# Patient Record
Sex: FEMALE | Race: BLACK OR AFRICAN AMERICAN | NOT HISPANIC OR LATINO
[De-identification: names, ages, dates, MRNs, and addresses within clinical notes are randomized per-mention and may not be internally consistent; named-entity substitution may affect disease eponyms.]

---

## 2019-01-04 ENCOUNTER — APPOINTMENT (OUTPATIENT)
Dept: DERMATOLOGY | Facility: CLINIC | Age: 34
End: 2019-01-04
Payer: COMMERCIAL

## 2019-01-04 DIAGNOSIS — L30.9 DERMATITIS, UNSPECIFIED: ICD-10-CM

## 2019-01-04 PROCEDURE — 99203 OFFICE O/P NEW LOW 30 MIN: CPT

## 2019-01-04 RX ORDER — DOXYCYCLINE HYCLATE 100 MG/1
100 TABLET ORAL
Qty: 10 | Refills: 0 | Status: ACTIVE | COMMUNITY
Start: 2019-01-04 | End: 1900-01-01

## 2019-01-04 RX ORDER — CLOBETASOL PROPIONATE 0.5 MG/G
0.05 OINTMENT TOPICAL TWICE DAILY
Qty: 1 | Refills: 1 | Status: ACTIVE | COMMUNITY
Start: 2019-01-04 | End: 1900-01-01

## 2019-01-04 NOTE — HISTORY OF PRESENT ILLNESS
[FreeTextEntry1] : bug bite [de-identified] : 33F here for eval of \par 1. 2 large itchy bumps on thighs since yesterday. Very itchy. started as a pink bump now indurated 5/10 painful. No topicals applied. \par Pt denies travel to woody or forested areas recently. No one at home with similar sx. Denies f/c, arthralgias. \par \par SH: works for [employer]

## 2019-06-11 ENCOUNTER — RESULT REVIEW (OUTPATIENT)
Age: 34
End: 2019-06-11

## 2019-07-10 ENCOUNTER — APPOINTMENT (OUTPATIENT)
Dept: ANTEPARTUM | Facility: CLINIC | Age: 34
End: 2019-07-10

## 2019-07-12 ENCOUNTER — APPOINTMENT (OUTPATIENT)
Dept: ANTEPARTUM | Facility: CLINIC | Age: 34
End: 2019-07-12
Payer: COMMERCIAL

## 2019-07-12 ENCOUNTER — ASOB RESULT (OUTPATIENT)
Age: 34
End: 2019-07-12

## 2019-07-12 PROCEDURE — 76813 OB US NUCHAL MEAS 1 GEST: CPT

## 2019-07-12 PROCEDURE — 76801 OB US < 14 WKS SINGLE FETUS: CPT

## 2019-07-12 PROCEDURE — 36416 COLLJ CAPILLARY BLOOD SPEC: CPT

## 2019-08-14 ENCOUNTER — ASOB RESULT (OUTPATIENT)
Age: 34
End: 2019-08-14

## 2019-08-14 ENCOUNTER — APPOINTMENT (OUTPATIENT)
Dept: ANTEPARTUM | Facility: CLINIC | Age: 34
End: 2019-08-14
Payer: COMMERCIAL

## 2019-08-14 PROCEDURE — 99213 OFFICE O/P EST LOW 20 MIN: CPT | Mod: 25

## 2019-09-11 ENCOUNTER — APPOINTMENT (OUTPATIENT)
Dept: ANTEPARTUM | Facility: CLINIC | Age: 34
End: 2019-09-11
Payer: COMMERCIAL

## 2019-09-11 ENCOUNTER — ASOB RESULT (OUTPATIENT)
Age: 34
End: 2019-09-11

## 2019-09-11 PROCEDURE — 76811 OB US DETAILED SNGL FETUS: CPT

## 2019-09-25 ENCOUNTER — APPOINTMENT (OUTPATIENT)
Dept: ANTEPARTUM | Facility: CLINIC | Age: 34
End: 2019-09-25
Payer: COMMERCIAL

## 2019-09-25 ENCOUNTER — ASOB RESULT (OUTPATIENT)
Age: 34
End: 2019-09-25

## 2019-09-25 PROCEDURE — 76816 OB US FOLLOW-UP PER FETUS: CPT

## 2019-10-29 ENCOUNTER — EMERGENCY (EMERGENCY)
Facility: HOSPITAL | Age: 34
LOS: 1 days | Discharge: HOME | End: 2019-10-29
Attending: EMERGENCY MEDICINE | Admitting: EMERGENCY MEDICINE
Payer: COMMERCIAL

## 2019-10-29 VITALS
RESPIRATION RATE: 18 BRPM | OXYGEN SATURATION: 98 % | SYSTOLIC BLOOD PRESSURE: 112 MMHG | HEART RATE: 87 BPM | DIASTOLIC BLOOD PRESSURE: 68 MMHG

## 2019-10-29 VITALS
TEMPERATURE: 99 F | DIASTOLIC BLOOD PRESSURE: 80 MMHG | HEART RATE: 88 BPM | SYSTOLIC BLOOD PRESSURE: 114 MMHG | RESPIRATION RATE: 15 BRPM | OXYGEN SATURATION: 98 %

## 2019-10-29 LAB
ALBUMIN SERPL ELPH-MCNC: 2.8 G/DL — LOW (ref 3.3–5)
ALP SERPL-CCNC: 53 U/L — SIGNIFICANT CHANGE UP (ref 40–120)
ALT FLD-CCNC: 10 U/L — SIGNIFICANT CHANGE UP (ref 4–33)
ANION GAP SERPL CALC-SCNC: 11 MMO/L — SIGNIFICANT CHANGE UP (ref 7–14)
APPEARANCE UR: SIGNIFICANT CHANGE UP
AST SERPL-CCNC: 17 U/L — SIGNIFICANT CHANGE UP (ref 4–32)
BACTERIA # UR AUTO: HIGH
BASOPHILS # BLD AUTO: 0.03 K/UL — SIGNIFICANT CHANGE UP (ref 0–0.2)
BASOPHILS NFR BLD AUTO: 0.3 % — SIGNIFICANT CHANGE UP (ref 0–2)
BILIRUB SERPL-MCNC: < 0.2 MG/DL — LOW (ref 0.2–1.2)
BILIRUB UR-MCNC: NEGATIVE — SIGNIFICANT CHANGE UP
BLOOD UR QL VISUAL: NEGATIVE — SIGNIFICANT CHANGE UP
BUN SERPL-MCNC: 8 MG/DL — SIGNIFICANT CHANGE UP (ref 7–23)
CALCIUM SERPL-MCNC: 8.8 MG/DL — SIGNIFICANT CHANGE UP (ref 8.4–10.5)
CHLORIDE SERPL-SCNC: 106 MMOL/L — SIGNIFICANT CHANGE UP (ref 98–107)
CO2 SERPL-SCNC: 18 MMOL/L — LOW (ref 22–31)
COLOR SPEC: YELLOW — SIGNIFICANT CHANGE UP
CREAT SERPL-MCNC: 0.67 MG/DL — SIGNIFICANT CHANGE UP (ref 0.5–1.3)
EOSINOPHIL # BLD AUTO: 0.11 K/UL — SIGNIFICANT CHANGE UP (ref 0–0.5)
EOSINOPHIL NFR BLD AUTO: 1.1 % — SIGNIFICANT CHANGE UP (ref 0–6)
GLUCOSE SERPL-MCNC: 107 MG/DL — HIGH (ref 70–99)
GLUCOSE UR-MCNC: NEGATIVE — SIGNIFICANT CHANGE UP
HCT VFR BLD CALC: 28.9 % — LOW (ref 34.5–45)
HGB BLD-MCNC: 9.1 G/DL — LOW (ref 11.5–15.5)
IMM GRANULOCYTES NFR BLD AUTO: 1 % — SIGNIFICANT CHANGE UP (ref 0–1.5)
KETONES UR-MCNC: NEGATIVE — SIGNIFICANT CHANGE UP
LEUKOCYTE ESTERASE UR-ACNC: NEGATIVE — SIGNIFICANT CHANGE UP
LIDOCAIN IGE QN: 33.6 U/L — SIGNIFICANT CHANGE UP (ref 7–60)
LYMPHOCYTES # BLD AUTO: 1.47 K/UL — SIGNIFICANT CHANGE UP (ref 1–3.3)
LYMPHOCYTES # BLD AUTO: 15.1 % — SIGNIFICANT CHANGE UP (ref 13–44)
MCHC RBC-ENTMCNC: 30.7 PG — SIGNIFICANT CHANGE UP (ref 27–34)
MCHC RBC-ENTMCNC: 31.5 % — LOW (ref 32–36)
MCV RBC AUTO: 97.6 FL — SIGNIFICANT CHANGE UP (ref 80–100)
MONOCYTES # BLD AUTO: 0.75 K/UL — SIGNIFICANT CHANGE UP (ref 0–0.9)
MONOCYTES NFR BLD AUTO: 7.7 % — SIGNIFICANT CHANGE UP (ref 2–14)
MUCOUS THREADS # UR AUTO: SIGNIFICANT CHANGE UP
NEUTROPHILS # BLD AUTO: 7.27 K/UL — SIGNIFICANT CHANGE UP (ref 1.8–7.4)
NEUTROPHILS NFR BLD AUTO: 74.8 % — SIGNIFICANT CHANGE UP (ref 43–77)
NITRITE UR-MCNC: NEGATIVE — SIGNIFICANT CHANGE UP
NRBC # FLD: 0 K/UL — SIGNIFICANT CHANGE UP (ref 0–0)
PH UR: 6 — SIGNIFICANT CHANGE UP (ref 5–8)
PLATELET # BLD AUTO: 212 K/UL — SIGNIFICANT CHANGE UP (ref 150–400)
PMV BLD: 9.9 FL — SIGNIFICANT CHANGE UP (ref 7–13)
POTASSIUM SERPL-MCNC: 4 MMOL/L — SIGNIFICANT CHANGE UP (ref 3.5–5.3)
POTASSIUM SERPL-SCNC: 4 MMOL/L — SIGNIFICANT CHANGE UP (ref 3.5–5.3)
PROT SERPL-MCNC: 6.1 G/DL — SIGNIFICANT CHANGE UP (ref 6–8.3)
PROT UR-MCNC: 30 — SIGNIFICANT CHANGE UP
RBC # BLD: 2.96 M/UL — LOW (ref 3.8–5.2)
RBC # FLD: 13.2 % — SIGNIFICANT CHANGE UP (ref 10.3–14.5)
RBC CASTS # UR COMP ASSIST: SIGNIFICANT CHANGE UP (ref 0–?)
SODIUM SERPL-SCNC: 135 MMOL/L — SIGNIFICANT CHANGE UP (ref 135–145)
SP GR SPEC: 1.03 — SIGNIFICANT CHANGE UP (ref 1–1.04)
SQUAMOUS # UR AUTO: SIGNIFICANT CHANGE UP
UROBILINOGEN FLD QL: NORMAL — SIGNIFICANT CHANGE UP
WBC # BLD: 9.73 K/UL — SIGNIFICANT CHANGE UP (ref 3.8–10.5)
WBC # FLD AUTO: 9.73 K/UL — SIGNIFICANT CHANGE UP (ref 3.8–10.5)
WBC UR QL: HIGH (ref 0–?)

## 2019-10-29 PROCEDURE — 99283 EMERGENCY DEPT VISIT LOW MDM: CPT

## 2019-10-29 RX ORDER — DOCUSATE SODIUM 100 MG
1 CAPSULE ORAL
Qty: 20 | Refills: 0
Start: 2019-10-29

## 2019-10-29 RX ORDER — SODIUM CHLORIDE 9 MG/ML
1000 INJECTION INTRAMUSCULAR; INTRAVENOUS; SUBCUTANEOUS ONCE
Refills: 0 | Status: COMPLETED | OUTPATIENT
Start: 2019-10-29 | End: 2019-10-29

## 2019-10-29 RX ORDER — AER TRAVELER 0.5 G/1
1 SOLUTION RECTAL; TOPICAL
Qty: 1 | Refills: 0
Start: 2019-10-29

## 2019-10-29 RX ORDER — NITROFURANTOIN MACROCRYSTAL 50 MG
1 CAPSULE ORAL
Qty: 10 | Refills: 0
Start: 2019-10-29 | End: 2019-11-02

## 2019-10-29 RX ORDER — LIDOCAINE 4 G/100G
10 CREAM TOPICAL ONCE
Refills: 0 | Status: COMPLETED | OUTPATIENT
Start: 2019-10-29 | End: 2019-10-29

## 2019-10-29 RX ADMIN — LIDOCAINE 10 MILLILITER(S): 4 CREAM TOPICAL at 08:52

## 2019-10-29 RX ADMIN — Medication 30 MILLILITER(S): at 08:52

## 2019-10-29 RX ADMIN — SODIUM CHLORIDE 1000 MILLILITER(S): 9 INJECTION INTRAMUSCULAR; INTRAVENOUS; SUBCUTANEOUS at 08:52

## 2019-10-29 NOTE — ED ADULT NURSE NOTE - NSIMPLEMENTINTERV_GEN_ALL_ED
Implemented All Universal Safety Interventions:  [employer] to call system. Call bell, personal items and telephone within reach. Instruct patient to call for assistance. Room bathroom lighting operational. Non-slip footwear when patient is off stretcher. Physically safe environment: no spills, clutter or unnecessary equipment. Stretcher in lowest position, wheels locked, appropriate side rails in place.

## 2019-10-29 NOTE — ED PROVIDER NOTE - PROGRESS NOTE DETAILS
Carolyn Villa M.D. Resident  Fetal heart rate 141. Dr. Daniel: Pt was signed out to me awaiting medication and re-eval. Pt notes having some pain relief from lidocaine to hemorrhoid, non-thrombosed. Mild epigastric tenderness. Awaiting medication and labs. Dr. Daniel: Pt states feeling better. Pain has resolved. Tolerating PO. Advised small meals and sent Tuck's pads to pharmacy. Dr. Daniel: Pt states feeling better. Pain has resolved. Tolerating PO. No abd tenderness. Advised small meals and sent Tuck's pads to pharmacy. Sent Macrobid to pharmacy and discussed that a urine culture was sent.

## 2019-10-29 NOTE — ED PROVIDER NOTE - CLINICAL SUMMARY MEDICAL DECISION MAKING FREE TEXT BOX
Young healthy female with no sig pmhx, 27 weeks pregnant, pw chest pain and hemorrhoids. Low suspicion for ACS and PE, given normal vitals, non pleuritic, reproducible chest pain. More likely GERD and costochondritis from gravid uterus. Young healthy female with no sig pmhx, 27 weeks pregnant, pw chest pain and hemorrhoids. Low suspicion for ACS and PE, given normal vitals, non pleuritic, reproducible chest pain. More likely GERD and costochondritis from gravid uterus. hemorrhoid soft, not concerning for thrombosis.

## 2019-10-29 NOTE — ED PROVIDER NOTE - ATTENDING CONTRIBUTION TO CARE
pt presenting with two complaints.  27 weeks pregnant.  First is a burning sensation for weeks in the inframammillary fold on the Left.  Not exertional worse with laying flat and after eating.  Also with severe rectal pain - has a known hemhorroid.  Worse with walking or defecating.  No bleeding.    Gen: Well appearing in NAD  Head: NC/AT  Neck: trachea midline  Resp:  No distress  Abd: soft gravid - external hem non thrombosed ttp  Ext: no deformities  Neuro:  A&O appears non focal  Skin:  Warm and dry as visualized  Psych:  Normal affect and mood    Pt with Chest burning most consistent with GERD not consistent with a PE.  Also w painful hem.  Will recommend topical treatment and supportive care.  Will refer to OB for NST after medical treatment.  CP not consistent with an ACS based on description or timing

## 2019-10-29 NOTE — ED PROVIDER NOTE - PHYSICAL EXAMINATION
General: Patient awake alert NAD.   HEENT: normocephalic, atraumatic, EOMI, no scleral icterus.   Cardiac: RRR, S1, S2, no murmur, rubs, gallop.   LUNGS: + reproducible chest wall pain, CTA B/L no wheeze, rhonchi, rales.   Abdomen: soft, + gravid uterus, no rebound no guarding, no CVA tenderness.   EXT: Moving all extremities, + mild B/l feet swelling (pregnancy)  Neuro: A&Ox3, gait normal, no focal neurological deficits, CN 2-12 grossly intact  Skin: warm, dry, no rash, no lesions General: Patient awake alert NAD.   HEENT: normocephalic, atraumatic, EOMI, no scleral icterus.   Cardiac: RRR, S1, S2, no murmur, rubs, gallop.   LUNGS: + reproducible chest wall pain, CTA B/L no wheeze, rhonchi, rales.   Abdomen: soft, + gravid uterus, no rebound no guarding, no CVA tenderness, 1 cm external hemorrhoid soft, non thrombosed.   EXT: Moving all extremities, + mild B/l feet swelling (pregnancy)  Neuro: A&Ox3, gait normal, no focal neurological deficits, CN 2-12 grossly intact  Skin: warm, dry, no rash, no lesions

## 2019-10-29 NOTE — ED ADULT NURSE NOTE - CHIEF COMPLAINT
The patient is a 33y Female complaining of pain to left chest/left abd x 3 weeks, has discussed with obgyn whom said this was normal for pregnancy. But says the pain has gotten more constant in recent days. Also complaining of severe pain to hemmoriods.

## 2019-10-29 NOTE — ED ADULT NURSE NOTE - OBJECTIVE STATEMENT
ALOCx3 complaining of pain to left chest/abd x 3 weeks, was seen by private obgyn who related this pain to pregnancy pain. Also complaining of increasing  pain to hemmorids

## 2019-10-29 NOTE — ED ADULT NURSE NOTE - CHIEF COMPLAINT QUOTE
alert 27 weeks pregnant c/o left/ mid chest pain  started has been intermittent for 3 months now is constant  c/o SOB x 2 weeks  also c/o hemorrhoid pain

## 2019-10-29 NOTE — ED PROVIDER NOTE - NS ED ROS FT
GENERAL: No fever, chills  EYES: no vision changes, no discharge.   HEENT: no difficulty swallowing or speaking   CARDIAC: + chest pain, no SOB  PULMONARY: no cough, SOB  GI: no abdominal pain, n/v/d/c  : no dysuria, frequency, hematuria  SKIN: + hemorrhoid, no rashes, lesions, vesicles  NEURO: no headache, lightheadedness, paraesthesias.   MSK: No joint pain, myalgia, weakness.

## 2019-10-29 NOTE — ED PROVIDER NOTE - PATIENT PORTAL LINK FT
You can access the FollowMyHealth Patient Portal offered by [facility] by registering at the following website: [email or web address]. By joining [employer]’s FollowMyHealth portal, you will also be able to view your health information using other applications (apps) compatible with our system.

## 2019-10-29 NOTE — ED PROVIDER NOTE - OBJECTIVE STATEMENT
32 yo F pw chest pain. chest pain is left inframammary fold and epigastrium, burning, non pleuritic, reproducible, worse when supine, 3 months ago was intermittent, now constant, better with cool compress, no palpitations, no recent travel/immoboliztion. Hemorrhoid worse with in the past week, getting larger, pain with walking and sitting. Last ob visit 10/7, ob had no concerns, pt has no other complaints. 32 yo F pw chest pain. chest pain is left inframammary fold and epigastrium, burning, non pleuritic, reproducible, worse when supine, 3 months ago was intermittent, now constant, better with cool compress, no palpitations, no recent travel/immoboliztion. Hemorrhoid worse with in the past week, getting larger, pain with walking and sitting, no pain with BM. Last ob visit 10/7, ob had no concerns, pt has no other complaints.

## 2019-10-29 NOTE — ED PROVIDER NOTE - NSFOLLOWUPINSTRUCTIONS_ED_ALL_ED_FT
Rest and plenty of fluids.  Continue small meals. May take Maalox prior to meals.   May use Tucks medicated pads to affected area.

## 2019-10-30 LAB
BACTERIA UR CULT: SIGNIFICANT CHANGE UP
SPECIMEN SOURCE: SIGNIFICANT CHANGE UP

## 2019-11-11 ENCOUNTER — ASOB RESULT (OUTPATIENT)
Age: 34
End: 2019-11-11

## 2019-11-11 ENCOUNTER — APPOINTMENT (OUTPATIENT)
Dept: ANTEPARTUM | Facility: CLINIC | Age: 34
End: 2019-11-11
Payer: COMMERCIAL

## 2019-11-11 PROCEDURE — 76816 OB US FOLLOW-UP PER FETUS: CPT

## 2019-11-11 PROCEDURE — 76819 FETAL BIOPHYS PROFIL W/O NST: CPT

## 2020-01-30 ENCOUNTER — OUTPATIENT (OUTPATIENT)
Dept: OUTPATIENT SERVICES | Facility: HOSPITAL | Age: 35
LOS: 1 days | End: 2020-01-30

## 2020-01-30 VITALS
SYSTOLIC BLOOD PRESSURE: 116 MMHG | WEIGHT: 223.99 LBS | TEMPERATURE: 98 F | OXYGEN SATURATION: 98 % | HEART RATE: 98 BPM | HEIGHT: 64 IN | DIASTOLIC BLOOD PRESSURE: 76 MMHG | RESPIRATION RATE: 16 BRPM

## 2020-01-30 DIAGNOSIS — O48.0 POST-TERM PREGNANCY: ICD-10-CM

## 2020-01-30 DIAGNOSIS — Z34.90 ENCOUNTER FOR SUPERVISION OF NORMAL PREGNANCY, UNSPECIFIED, UNSPECIFIED TRIMESTER: ICD-10-CM

## 2020-01-30 DIAGNOSIS — Z98.890 OTHER SPECIFIED POSTPROCEDURAL STATES: Chronic | ICD-10-CM

## 2020-01-30 LAB
APPEARANCE UR: CLEAR — SIGNIFICANT CHANGE UP
BACTERIA # UR AUTO: HIGH
BILIRUB UR-MCNC: NEGATIVE — SIGNIFICANT CHANGE UP
BLD GP AB SCN SERPL QL: NEGATIVE — SIGNIFICANT CHANGE UP
BLOOD UR QL VISUAL: NEGATIVE — SIGNIFICANT CHANGE UP
COLOR SPEC: YELLOW — SIGNIFICANT CHANGE UP
GLUCOSE UR-MCNC: NEGATIVE — SIGNIFICANT CHANGE UP
HCT VFR BLD CALC: 29.9 % — LOW (ref 34.5–45)
HGB BLD-MCNC: 9.5 G/DL — LOW (ref 11.5–15.5)
KETONES UR-MCNC: SIGNIFICANT CHANGE UP
LEUKOCYTE ESTERASE UR-ACNC: SIGNIFICANT CHANGE UP
MCHC RBC-ENTMCNC: 29.7 PG — SIGNIFICANT CHANGE UP (ref 27–34)
MCHC RBC-ENTMCNC: 31.8 % — LOW (ref 32–36)
MCV RBC AUTO: 93.4 FL — SIGNIFICANT CHANGE UP (ref 80–100)
NITRITE UR-MCNC: NEGATIVE — SIGNIFICANT CHANGE UP
NRBC # FLD: 0 K/UL — SIGNIFICANT CHANGE UP (ref 0–0)
PH UR: 6.5 — SIGNIFICANT CHANGE UP (ref 5–8)
PLATELET # BLD AUTO: 226 K/UL — SIGNIFICANT CHANGE UP (ref 150–400)
PMV BLD: 11 FL — SIGNIFICANT CHANGE UP (ref 7–13)
PROT UR-MCNC: 50 — SIGNIFICANT CHANGE UP
RBC # BLD: 3.2 M/UL — LOW (ref 3.8–5.2)
RBC # FLD: 14.2 % — SIGNIFICANT CHANGE UP (ref 10.3–14.5)
RBC CASTS # UR COMP ASSIST: SIGNIFICANT CHANGE UP (ref 0–?)
RH IG SCN BLD-IMP: POSITIVE — SIGNIFICANT CHANGE UP
SP GR SPEC: 1.04 — SIGNIFICANT CHANGE UP (ref 1–1.04)
SQUAMOUS # UR AUTO: SIGNIFICANT CHANGE UP
UROBILINOGEN FLD QL: NORMAL — SIGNIFICANT CHANGE UP
WBC # BLD: 8.44 K/UL — SIGNIFICANT CHANGE UP (ref 3.8–10.5)
WBC # FLD AUTO: 8.44 K/UL — SIGNIFICANT CHANGE UP (ref 3.8–10.5)
WBC UR QL: HIGH (ref 0–?)

## 2020-01-30 NOTE — OB PST NOTE - NSHPPHYSICALEXAM_GEN_ALL_CORE
Constitutional: Well Developed, Well Groomed, Well Nourished, No Distress    Eyes: PERRL, EOMI, conjunctiva clear    Ears: Normal    Mouth & Gums: Normal, moist    Pharynx: No tenderness, discharge, or peritonsillar abscess    Tonsils: No Redness, discharge, tenderness, or swelling    Neck: Supple, no JVD, normal thyroid glands, no carotid bruits, no cervical vertebral or paraspinal tenderness    Breast: Normal shape, no masses, no tenderness, nipples normal, no nipple discharge    Back: Normal shape, ROM intact, strength intact, no vertebral tenderness    Respiratory: Airway patent, breath sounds equal, good air movement, respiration non-labored, clear to auscultation bilateral, no chest wall tenderness, no intercostal retractions, no rales, no wheezes, no rhonchi, no subcutaneous emphysema    Cardiovascular:  Regular rate and rhythm, no rubs or murmur, normal PMI    Gastrointestinal: Gravid abdomen, soft, non-tender, non distention, bowel sound normal    Extremities: No clubbing, cyanosis, or pedal edema    Vascular: Radial Pulse normal,     Neurological: alert & oriented x 3, no focal deficits    Skin: warm and dry, normal color    Lymph Nodes: No anterior cervical lymphadenopathy.     Musculoskeletal: ROM intact, no joint swelling, warmth, or calf tenderness. Normal strength    Psychiatric: normal affect, normal behavior Constitutional: Well Developed, Well Groomed, Well Nourished, No Distress    Eyes: PERRL, EOMI, conjunctiva clear    Ears: Normal    Mouth & Gums: Normal, moist    Neck: Supple, no JVD,     Breast: Normal shape,    Back: Normal shape, ROM intact,     Respiratory: Airway patent, breath sounds equal, good air movement, respiration non-labored, clear to auscultation bilateral, no chest wall tenderness, no rales, no wheezes, no rhonchi,     Cardiovascular:  Regular rate and rhythm, positive S1,S2    Gastrointestinal: Gravid abdomen, soft, non-tender, bowel sound normal    Extremities: No clubbing, cyanosis, or pedal edema    Vascular: Radial Pulse normal,     Neurological: alert & oriented x 3, no focal deficits    Skin: warm and dry, normal color    Lymph Nodes: No anterior cervical lymphadenopathy.     Musculoskeletal: ROM intact, no joint swelling, warmth, or calf tenderness.     Psychiatric: normal affect, normal behavior

## 2020-01-30 NOTE — OB PST NOTE - NSANTHOSAYNRD_GEN_A_CORE
No. NICOL screening performed.  STOP BANG Legend: 0-2 = LOW Risk; 3-4 = INTERMEDIATE Risk; 5-8 = HIGH Risk

## 2020-01-30 NOTE — OB PST NOTE - PROBLEM SELECTOR PLAN 1
Patient is scheduled for induction on 2/1/20. Pre-op instructions provided. Pt given verbal and written instructions with teach back. Pt verbalized understanding with return demonstration.   Labs-T&S, CBC, RPR, UA pending

## 2020-01-30 NOTE — OB PST NOTE - NSHPREVIEWOFSYSTEMS_GEN_ALL_CORE
General: No fever, chills, sweating. No polyphagia, polyurea, polydypsia, malaise, or fatigue    Skin: No rashes, itching, or dryness.  No tumors, brittle nails, pitted nails, or hair loss    Breast: No tenderness, lumps, or nipple discharge      Ophthalmologic: No diplopia, photophobia, lacrimation, blurred Vision , or eye discharge    ENMT Symptoms: No hearing difficulty, ear pain, tinnitus, or vertigo. No sinus symptoms, nasal congestion, nasal   discharge, or nasal obstruction    Respiratory and Thorax: No wheezing, dyspnea, cough, hemoptysis, or pleuritic chest Pain     Cardiovascular: No chest pain, palpitations, dyspnea on exertion, orthopnea, paroxysmal nocturnal dyspnea,   peripheral edema, or claudication    Gastrointestinal: No nausea, vomiting, diarrhea, constipation, change in bowel habits, flatulence, abdominal pain, or melena    Genitourinary/ Pelvis: No hematuria,  or flank pain.  No urine discoloration, incontinence, dysuria, or urinary hesitancy. Normal urinary frequency. No nocturia, abnormal vaginal bleeding, vaginal discharge, spotting, pelvic pain, or vaginal leakage    Musculoskeletal: Positive intermittent lower back pain with right leg pain, sciatica. Denies  arthritis, joint swelling,  muscle weakness, neck pain, or arm pain    Neurological: No transient paralysis, weakness, paresthesias, or seizures. No syncope, tremors, vertigo, loss of sensation, difficulty walking, loss of consciousness, hemiparesis, confusion, or facial palsy    Psychiatric: No suicidal ideation, depression, anxiety    Hematology: No gum bleeding, nose bleeding, or skin lumps    Lymphatic: No enlarged or tender lymph nodes. No extremity swelling    Endocrine: No heat or cold intolerance    Immunologic: No recurrent or persistent infections General: No fever, chills, sweating. No polyphagia, polyurea, polydypsia, malaise, or fatigue    Skin: No rashes, itching, or dryness.  No tumors, brittle nails, pitted nails, or hair loss    Breast: No tenderness, lumps, or nipple discharge      Ophthalmologic: No diplopia, photophobia, lacrimation, blurred Vision , or eye discharge    ENMT Symptoms: No hearing difficulty, ear pain, tinnitus, or vertigo. No sinus symptoms, nasal congestion, nasal   discharge, or nasal obstruction    Respiratory and Thorax: No wheezing, dyspnea, cough, hemoptysis, or pleuritic chest Pain     Cardiovascular: No chest pain, palpitations, dyspnea on exertion, orthopnea, paroxysmal nocturnal dyspnea, or   peripheral edema    Gastrointestinal: No nausea, vomiting,  change in bowel habits, flatulence, abdominal pain, or melena    Genitourinary/ Pelvis: No hematuria,  or flank pain.  No urine discoloration, dysuria, or urinary hesitancy. Normal urinary frequency. No nocturia, abnormal vaginal bleeding, vaginal discharge, spotting, pelvic pain, or vaginal leakage    Musculoskeletal: Positive intermittent lower back pain with right leg pain, sciatica. Denies  arthritis, joint swelling,  muscle weakness, neck pain, or arm pain    Neurological: No transient paralysis, weakness, paresthesias, or seizures. No syncope, tremors, vertigo, loss of sensation, difficulty walking, loss of consciousness, hemiparesis, confusion, or facial palsy    Psychiatric: No suicidal ideation, depression, anxiety    Hematology: No gum bleeding, nose bleeding, or skin lumps    Lymphatic: No enlarged or tender lymph nodes. No extremity swelling    Endocrine: No heat or cold intolerance    Immunologic: No recurrent or persistent infections

## 2020-01-31 ENCOUNTER — INPATIENT (INPATIENT)
Facility: HOSPITAL | Age: 35
LOS: 4 days | Discharge: HOME | End: 2020-02-05
Attending: OBSTETRICS & GYNECOLOGY | Admitting: OBSTETRICS & GYNECOLOGY

## 2020-01-31 ENCOUNTER — TRANSCRIPTION ENCOUNTER (OUTPATIENT)
Age: 35
End: 2020-01-31

## 2020-01-31 VITALS
SYSTOLIC BLOOD PRESSURE: 114 MMHG | TEMPERATURE: 98 F | HEART RATE: 91 BPM | DIASTOLIC BLOOD PRESSURE: 63 MMHG | RESPIRATION RATE: 17 BRPM

## 2020-01-31 DIAGNOSIS — Z3A.00 WEEKS OF GESTATION OF PREGNANCY NOT SPECIFIED: ICD-10-CM

## 2020-01-31 DIAGNOSIS — O26.899 OTHER SPECIFIED PREGNANCY RELATED CONDITIONS, UNSPECIFIED TRIMESTER: ICD-10-CM

## 2020-01-31 DIAGNOSIS — Z98.890 OTHER SPECIFIED POSTPROCEDURAL STATES: Chronic | ICD-10-CM

## 2020-01-31 LAB
BASOPHILS # BLD AUTO: 0.04 K/UL — SIGNIFICANT CHANGE UP (ref 0–0.2)
BASOPHILS NFR BLD AUTO: 0.3 % — SIGNIFICANT CHANGE UP (ref 0–2)
EOSINOPHIL # BLD AUTO: 0.06 K/UL — SIGNIFICANT CHANGE UP (ref 0–0.5)
EOSINOPHIL NFR BLD AUTO: 0.5 % — SIGNIFICANT CHANGE UP (ref 0–6)
HCT VFR BLD CALC: 32.6 % — LOW (ref 34.5–45)
HGB BLD-MCNC: 10.4 G/DL — LOW (ref 11.5–15.5)
IMM GRANULOCYTES NFR BLD AUTO: 0.6 % — SIGNIFICANT CHANGE UP (ref 0–1.5)
LYMPHOCYTES # BLD AUTO: 1.4 K/UL — SIGNIFICANT CHANGE UP (ref 1–3.3)
LYMPHOCYTES # BLD AUTO: 11.6 % — LOW (ref 13–44)
MCHC RBC-ENTMCNC: 29.9 PG — SIGNIFICANT CHANGE UP (ref 27–34)
MCHC RBC-ENTMCNC: 31.9 % — LOW (ref 32–36)
MCV RBC AUTO: 93.7 FL — SIGNIFICANT CHANGE UP (ref 80–100)
MONOCYTES # BLD AUTO: 0.9 K/UL — SIGNIFICANT CHANGE UP (ref 0–0.9)
MONOCYTES NFR BLD AUTO: 7.4 % — SIGNIFICANT CHANGE UP (ref 2–14)
NEUTROPHILS # BLD AUTO: 9.62 K/UL — HIGH (ref 1.8–7.4)
NEUTROPHILS NFR BLD AUTO: 79.6 % — HIGH (ref 43–77)
NRBC # FLD: 0 K/UL — SIGNIFICANT CHANGE UP (ref 0–0)
PLATELET # BLD AUTO: 234 K/UL — SIGNIFICANT CHANGE UP (ref 150–400)
PMV BLD: 11 FL — SIGNIFICANT CHANGE UP (ref 7–13)
RBC # BLD: 3.48 M/UL — LOW (ref 3.8–5.2)
RBC # FLD: 14.3 % — SIGNIFICANT CHANGE UP (ref 10.3–14.5)
RH IG SCN BLD-IMP: POSITIVE — SIGNIFICANT CHANGE UP
T PALLIDUM AB TITR SER: NEGATIVE — SIGNIFICANT CHANGE UP
T PALLIDUM AB TITR SER: NEGATIVE — SIGNIFICANT CHANGE UP
WBC # BLD: 12.09 K/UL — HIGH (ref 3.8–10.5)
WBC # FLD AUTO: 12.09 K/UL — HIGH (ref 3.8–10.5)

## 2020-01-31 RX ORDER — TETANUS TOXOID, REDUCED DIPHTHERIA TOXOID AND ACELLULAR PERTUSSIS VACCINE, ADSORBED 5; 2.5; 8; 8; 2.5 [IU]/.5ML; [IU]/.5ML; UG/.5ML; UG/.5ML; UG/.5ML
0.5 SUSPENSION INTRAMUSCULAR ONCE
Refills: 0 | Status: DISCONTINUED | OUTPATIENT
Start: 2020-02-01 | End: 2020-02-05

## 2020-01-31 RX ORDER — OXYTOCIN 10 UNIT/ML
2 VIAL (ML) INJECTION
Qty: 30 | Refills: 0 | Status: DISCONTINUED | OUTPATIENT
Start: 2020-01-31 | End: 2020-02-01

## 2020-01-31 RX ORDER — ACETAMINOPHEN 500 MG
975 TABLET ORAL
Refills: 0 | Status: DISCONTINUED | OUTPATIENT
Start: 2020-02-01 | End: 2020-02-05

## 2020-01-31 RX ORDER — SIMETHICONE 80 MG/1
80 TABLET, CHEWABLE ORAL EVERY 4 HOURS
Refills: 0 | Status: DISCONTINUED | OUTPATIENT
Start: 2020-02-01 | End: 2020-02-05

## 2020-01-31 RX ORDER — OXYTOCIN 10 UNIT/ML
333.33 VIAL (ML) INJECTION
Qty: 20 | Refills: 0 | Status: DISCONTINUED | OUTPATIENT
Start: 2020-01-31 | End: 2020-02-01

## 2020-01-31 RX ORDER — DIPHENHYDRAMINE HCL 50 MG
25 CAPSULE ORAL EVERY 6 HOURS
Refills: 0 | Status: DISCONTINUED | OUTPATIENT
Start: 2020-02-01 | End: 2020-02-05

## 2020-01-31 RX ORDER — FAMOTIDINE 10 MG/ML
20 INJECTION INTRAVENOUS ONCE
Refills: 0 | Status: DISCONTINUED | OUTPATIENT
Start: 2020-01-31 | End: 2020-02-01

## 2020-01-31 RX ORDER — MAGNESIUM HYDROXIDE 400 MG/1
30 TABLET, CHEWABLE ORAL
Refills: 0 | Status: DISCONTINUED | OUTPATIENT
Start: 2020-02-01 | End: 2020-02-05

## 2020-01-31 RX ORDER — METOCLOPRAMIDE HCL 10 MG
10 TABLET ORAL ONCE
Refills: 0 | Status: DISCONTINUED | OUTPATIENT
Start: 2020-01-31 | End: 2020-02-01

## 2020-01-31 RX ORDER — OXYCODONE HYDROCHLORIDE 5 MG/1
5 TABLET ORAL ONCE
Refills: 0 | Status: DISCONTINUED | OUTPATIENT
Start: 2020-02-01 | End: 2020-02-05

## 2020-01-31 RX ORDER — HEPARIN SODIUM 5000 [USP'U]/ML
5000 INJECTION INTRAVENOUS; SUBCUTANEOUS EVERY 12 HOURS
Refills: 0 | Status: DISCONTINUED | OUTPATIENT
Start: 2020-02-01 | End: 2020-02-05

## 2020-01-31 RX ORDER — SODIUM CHLORIDE 9 MG/ML
1000 INJECTION, SOLUTION INTRAVENOUS
Refills: 0 | Status: DISCONTINUED | OUTPATIENT
Start: 2020-01-31 | End: 2020-02-01

## 2020-01-31 RX ORDER — LANOLIN
1 OINTMENT (GRAM) TOPICAL EVERY 6 HOURS
Refills: 0 | Status: DISCONTINUED | OUTPATIENT
Start: 2020-02-01 | End: 2020-02-05

## 2020-01-31 RX ORDER — CITRIC ACID/SODIUM CITRATE 300-500 MG
30 SOLUTION, ORAL ORAL ONCE
Refills: 0 | Status: DISCONTINUED | OUTPATIENT
Start: 2020-01-31 | End: 2020-02-01

## 2020-01-31 RX ORDER — OXYTOCIN 10 UNIT/ML
333.33 VIAL (ML) INJECTION
Qty: 20 | Refills: 0 | Status: DISCONTINUED | OUTPATIENT
Start: 2020-02-01 | End: 2020-02-01

## 2020-01-31 RX ORDER — SODIUM CHLORIDE 9 MG/ML
1000 INJECTION, SOLUTION INTRAVENOUS ONCE
Refills: 0 | Status: COMPLETED | OUTPATIENT
Start: 2020-01-31 | End: 2020-01-31

## 2020-01-31 RX ORDER — IBUPROFEN 200 MG
600 TABLET ORAL EVERY 6 HOURS
Refills: 0 | Status: COMPLETED | OUTPATIENT
Start: 2020-02-01 | End: 2020-12-30

## 2020-01-31 RX ORDER — GLYCERIN ADULT
1 SUPPOSITORY, RECTAL RECTAL AT BEDTIME
Refills: 0 | Status: DISCONTINUED | OUTPATIENT
Start: 2020-02-01 | End: 2020-02-05

## 2020-01-31 RX ORDER — OXYCODONE HYDROCHLORIDE 5 MG/1
5 TABLET ORAL
Refills: 0 | Status: COMPLETED | OUTPATIENT
Start: 2020-02-01 | End: 2020-02-08

## 2020-01-31 RX ORDER — SODIUM CHLORIDE 9 MG/ML
1000 INJECTION, SOLUTION INTRAVENOUS
Refills: 0 | Status: DISCONTINUED | OUTPATIENT
Start: 2020-02-01 | End: 2020-02-01

## 2020-01-31 RX ORDER — KETOROLAC TROMETHAMINE 30 MG/ML
30 SYRINGE (ML) INJECTION EVERY 6 HOURS
Refills: 0 | Status: DISCONTINUED | OUTPATIENT
Start: 2020-02-01 | End: 2020-02-02

## 2020-01-31 RX ADMIN — SODIUM CHLORIDE 1000 MILLILITER(S): 9 INJECTION, SOLUTION INTRAVENOUS at 06:15

## 2020-01-31 RX ADMIN — Medication 2 MILLIUNIT(S)/MIN: at 10:11

## 2020-01-31 RX ADMIN — Medication 2 MILLIUNIT(S)/MIN: at 21:29

## 2020-01-31 NOTE — OB PROVIDER H&P - ATTENDING COMMENTS
40 6/7 wks admitted in labor at 3/80 c/o pain 10/10 level   admit for pain control - epidural and labor and delivery management

## 2020-01-31 NOTE — CHART NOTE - NSCHARTNOTEFT_GEN_A_CORE
R1 Labor Note    Patient examined for cervical change. Reporting increased pressure.    SVE: 9/100/0  EFM: 150bpm/mod steve/-decels  TOCO: reg q2min    A/P:  - Continue current management    Plant to be discussed with Dr. Sheffield when she is out of the OR.    Iveth Castro, PGY-1

## 2020-01-31 NOTE — DISCHARGE NOTE OB - PATIENT PORTAL LINK FT
You can access the FollowMyHealth Patient Portal offered by [facility] by registering at the following website: [email or web address]. By joining [employer]’s FollowMyHealth portal, you will also be able to view your health information using other applications (apps) compatible with our system.

## 2020-01-31 NOTE — DISCHARGE NOTE OB - CARE PROVIDER_API CALL
Brittni Castellon)  [place] [address]  Phone: [phone]  Fax: [phone]  Follow Up Time: 2 weeks

## 2020-01-31 NOTE — CHART NOTE - NSCHARTNOTEFT_GEN_A_CORE
Patient feeling pain with ctx  VSS temp 37.9  SVE 9 thick anterior lip no change per resident  EFM +moderate variability -decel ctx adequate by IUPC    IMP  IUP  Failure to progress  PLAN  primary CS  dw patient  risk and benefit, bleeding, infection ,damage to bowel, bladder, ureters, kidneys  dw patient pain management and post op care  consent obtained  PATRICIA Duke

## 2020-01-31 NOTE — OB PROVIDER TRIAGE NOTE - NSOBPROVIDERNOTE_OBGYN_ALL_OB_FT
4:20- Patient presented to Dr. Banegas  Patient requesting pain management  Admit for Labor  Epidural

## 2020-01-31 NOTE — OB PROVIDER H&P - NS_OBGYNHISTORY_OBGYN_ALL_OB_FT
GYN: Joseies  OB: TOPx1 with D&C 2010    AP course uncomplicated  -GBS Negative  -Was seen and treated recently for hemorrhoids during pregnancy, on medication

## 2020-01-31 NOTE — OB PROVIDER TRIAGE NOTE - HISTORY OF PRESENT ILLNESS
35y/o  @40.6wks presents with painful contractions, pain scale 8/10.  Reports good fetal movement  Denies LOF/VB    Allergies: Peanuts- anaphylaxis  Medications: PNV    Denies Medical and Surgical HX  Denies Psy/Etoh/Smoke/Drugs

## 2020-01-31 NOTE — OB RN PATIENT PROFILE - ALERT: PERTINENT HISTORY
Ultra Screen at 12 Weeks/1st Trimester Sonogram/Non Invasive Prenatal Screen (NIPS)/20 Week Level II Sonogram/Follow up Sonogram for Growth

## 2020-01-31 NOTE — OB PROVIDER H&P - HISTORY OF PRESENT ILLNESS
33y/o  @40.6wks presents with painful contractions, pain scale 8/10.  Reports good fetal movement  Denies LOF/VB    Allergies: Peanuts- anaphylaxis  Medications: PNV    Denies Medical and Surgical HX  Denies Psy/Etoh/Smoke/Drugs

## 2020-01-31 NOTE — DISCHARGE NOTE OB - HOSPITAL COURSE
patient admitted in labor  pitocin augmentation  IUPC and FSE   arrest of dilation at 9 cm   primary CS-LST delivery live

## 2020-01-31 NOTE — OB PROVIDER TRIAGE NOTE - NSHPPHYSICALEXAM_GEN_ALL_CORE
Vital Signs Last 24 Hrs  T(C): 36.6 (31 Jan 2020 03:58), Max: 36.6 (31 Jan 2020 03:23)  T(F): 97.88 (31 Jan 2020 03:58), Max: 97.9 (31 Jan 2020 03:23)  HR: 94 (31 Jan 2020 04:05) (91 - 98)  BP: 101/62 (31 Jan 2020 04:05) (101/62 - 116/76)  RR: 17 (31 Jan 2020 03:23) (16 - 17)  SpO2: 98% (30 Jan 2020 18:12) (98% - 98%)    Assessment reveals VSS  Abdomen soft, NT, gravid  A&Ox3  Lungs-Clear bilateral  Heart-Normal rate and rhythm  Cat 1 tracing, ctx q3mins, variables  Vaginal Exam: 3/80/-3, intact

## 2020-01-31 NOTE — DISCHARGE NOTE OB - MATERIALS PROVIDED
Bottle Feeding Log/Shaken Baby Prevention Handout/Breastfeeding Log/Back To Sleep Handout/Birth Certificate Instructions/Vaccinations/  Immunization Record/Guide to Postpartum Care/Discharge Medication Information for Patients and Families Pocket Guide

## 2020-01-31 NOTE — OB RN TRIAGE NOTE - CHIEF COMPLAINT QUOTE
"Contractions every 3 /5 minutes from 2am " "Contractions every 3 /5 minutes from 2am and I am scheduled for induction on February First for post due date"

## 2020-01-31 NOTE — OB PROVIDER IHI INDUCTION/AUGMENTATION NOTE - NS_CHECKALL_OBGYN_ALL_OB
Induction / Augmentation was discussed/FHR was reviewed/Order was written/Contractions pattern was reviewed/H&P was completed
H&P was completed/Induction / Augmentation was discussed/Order was written/FHR was reviewed/Contractions pattern was reviewed

## 2020-01-31 NOTE — CHART NOTE - NSCHARTNOTEFT_GEN_A_CORE
PA Note    patient seen & examined for cont of management. Feeling some rectal pressure    VS  T(C): 36.8 (01-31-20 @ 13:00)  HR: 94 (01-31-20 @ 13:16)  BP: 111/70 (01-31-20 @ 13:06)  RR: 16 (01-31-20 @ 06:14)  SpO2: 100% (01-31-20 @ 13:16)    /mod steve/early & intermittent variable decels  [place] q 2-4min  5/80/-3 IUPC placed    cont efm/toco  titrate pitocin with IUPC  patient repositioned with resuscitative measures in place  dw Dr Conrado gomez

## 2020-01-31 NOTE — CHART NOTE - NSCHARTNOTEFT_GEN_A_CORE
R1 Progress Note    Pt checked for increased pressure.    Vital Signs Last 24 Hrs  T(C): 37.4 (31 Jan 2020 17:00), Max: 37.4 (31 Jan 2020 17:00)  HR: 102 (31 Jan 2020 19:11) (73 - 117)  BP: 128/89 (31 Jan 2020 19:06) (101/62 - 166/83)  RR: 16 (31 Jan 2020 06:14) (16 - 17)  SpO2: 98% (31 Jan 2020 19:11) (90% - 100%)    /mod/+accel/+early decels  [place] q2min  VE 9.5/90/-1    c/w pit  peanut ball  ok for top-off    D/w Dr. Leilani Moore PGY-1

## 2020-01-31 NOTE — CHART NOTE - NSCHARTNOTEFT_GEN_A_CORE
Attending Note     PT seen and examined     AFVSS  Gen in NAD   Abd soft, gravid  Ext: no c/c/e    SVE 5/80/-2, AROMed for clear fluid   FHTS 125 mod steve no decels + accels   TOCO q 2-5 min     A/P: nullip at term transitioning into active labor   start pitocin     Yaneth Sheffield MD MSc

## 2020-01-31 NOTE — CHART NOTE - NSCHARTNOTEFT_GEN_A_CORE
Patient comfortable    VSS  SVE 9/90/-1 thick anterior lip  IUPC and FSE placed w/o difficulty  EFM +moderate variability +decels with ctx  ctx q 2-4    IMP  arrest of dilation x 4 hours  cat 2 tracing  PLAN  dw patient, recommend CS  risk and benefit, bleeding, infection, damage to bowel, bladder, ureters, kidneys  patient doesn't want CS  will continue observation and reevaluate tracing  PATRICIA Duke

## 2020-02-01 RX ORDER — ONDANSETRON 8 MG/1
4 TABLET, FILM COATED ORAL EVERY 6 HOURS
Refills: 0 | Status: DISCONTINUED | OUTPATIENT
Start: 2020-02-01 | End: 2020-02-02

## 2020-02-01 RX ORDER — HYDROMORPHONE HYDROCHLORIDE 2 MG/ML
0.5 INJECTION INTRAMUSCULAR; INTRAVENOUS; SUBCUTANEOUS
Refills: 0 | Status: DISCONTINUED | OUTPATIENT
Start: 2020-02-01 | End: 2020-02-01

## 2020-02-01 RX ORDER — BUTORPHANOL TARTRATE 2 MG/ML
0.25 INJECTION, SOLUTION INTRAMUSCULAR; INTRAVENOUS EVERY 6 HOURS
Refills: 0 | Status: DISCONTINUED | OUTPATIENT
Start: 2020-02-01 | End: 2020-02-02

## 2020-02-01 RX ORDER — OXYCODONE HYDROCHLORIDE 5 MG/1
5 TABLET ORAL
Refills: 0 | Status: DISCONTINUED | OUTPATIENT
Start: 2020-02-01 | End: 2020-02-02

## 2020-02-01 RX ORDER — HYDROMORPHONE HYDROCHLORIDE 2 MG/ML
1 INJECTION INTRAMUSCULAR; INTRAVENOUS; SUBCUTANEOUS
Refills: 0 | Status: DISCONTINUED | OUTPATIENT
Start: 2020-02-01 | End: 2020-02-02

## 2020-02-01 RX ORDER — NALOXONE HYDROCHLORIDE 4 MG/.1ML
0.1 SPRAY NASAL
Refills: 0 | Status: DISCONTINUED | OUTPATIENT
Start: 2020-02-01 | End: 2020-02-02

## 2020-02-01 RX ORDER — OXYCODONE HYDROCHLORIDE 5 MG/1
10 TABLET ORAL
Refills: 0 | Status: DISCONTINUED | OUTPATIENT
Start: 2020-02-01 | End: 2020-02-02

## 2020-02-01 RX ADMIN — HEPARIN SODIUM 5000 UNIT(S): 5000 INJECTION INTRAVENOUS; SUBCUTANEOUS at 17:49

## 2020-02-01 RX ADMIN — Medication 975 MILLIGRAM(S): at 11:20

## 2020-02-01 RX ADMIN — HYDROMORPHONE HYDROCHLORIDE 1 MILLIGRAM(S): 2 INJECTION INTRAMUSCULAR; INTRAVENOUS; SUBCUTANEOUS at 02:40

## 2020-02-01 RX ADMIN — Medication 30 MILLIGRAM(S): at 08:53

## 2020-02-01 RX ADMIN — Medication 30 MILLIGRAM(S): at 22:50

## 2020-02-01 RX ADMIN — HYDROMORPHONE HYDROCHLORIDE 0.5 MILLIGRAM(S): 2 INJECTION INTRAMUSCULAR; INTRAVENOUS; SUBCUTANEOUS at 03:02

## 2020-02-01 RX ADMIN — OXYCODONE HYDROCHLORIDE 5 MILLIGRAM(S): 5 TABLET ORAL at 16:38

## 2020-02-01 RX ADMIN — Medication 30 MILLIGRAM(S): at 15:14

## 2020-02-01 RX ADMIN — HYDROMORPHONE HYDROCHLORIDE 0.5 MILLIGRAM(S): 2 INJECTION INTRAMUSCULAR; INTRAVENOUS; SUBCUTANEOUS at 03:19

## 2020-02-01 RX ADMIN — OXYCODONE HYDROCHLORIDE 5 MILLIGRAM(S): 5 TABLET ORAL at 11:20

## 2020-02-01 RX ADMIN — OXYCODONE HYDROCHLORIDE 5 MILLIGRAM(S): 5 TABLET ORAL at 17:20

## 2020-02-01 RX ADMIN — HYDROMORPHONE HYDROCHLORIDE 1 MILLIGRAM(S): 2 INJECTION INTRAMUSCULAR; INTRAVENOUS; SUBCUTANEOUS at 02:16

## 2020-02-01 RX ADMIN — HYDROMORPHONE HYDROCHLORIDE 0.5 MILLIGRAM(S): 2 INJECTION INTRAMUSCULAR; INTRAVENOUS; SUBCUTANEOUS at 03:00

## 2020-02-01 RX ADMIN — OXYCODONE HYDROCHLORIDE 5 MILLIGRAM(S): 5 TABLET ORAL at 21:10

## 2020-02-01 RX ADMIN — Medication 975 MILLIGRAM(S): at 16:38

## 2020-02-01 RX ADMIN — Medication 30 MILLIGRAM(S): at 16:00

## 2020-02-01 RX ADMIN — Medication 975 MILLIGRAM(S): at 23:55

## 2020-02-01 RX ADMIN — OXYCODONE HYDROCHLORIDE 5 MILLIGRAM(S): 5 TABLET ORAL at 23:55

## 2020-02-01 RX ADMIN — OXYCODONE HYDROCHLORIDE 5 MILLIGRAM(S): 5 TABLET ORAL at 20:35

## 2020-02-01 RX ADMIN — OXYCODONE HYDROCHLORIDE 5 MILLIGRAM(S): 5 TABLET ORAL at 10:39

## 2020-02-01 RX ADMIN — Medication 30 MILLIGRAM(S): at 22:10

## 2020-02-01 RX ADMIN — Medication 975 MILLIGRAM(S): at 10:39

## 2020-02-01 RX ADMIN — Medication 975 MILLIGRAM(S): at 17:15

## 2020-02-01 RX ADMIN — HYDROMORPHONE HYDROCHLORIDE 0.5 MILLIGRAM(S): 2 INJECTION INTRAMUSCULAR; INTRAVENOUS; SUBCUTANEOUS at 04:28

## 2020-02-01 RX ADMIN — HYDROMORPHONE HYDROCHLORIDE 0.5 MILLIGRAM(S): 2 INJECTION INTRAMUSCULAR; INTRAVENOUS; SUBCUTANEOUS at 02:45

## 2020-02-01 RX ADMIN — Medication 30 MILLIGRAM(S): at 09:30

## 2020-02-01 RX ADMIN — HEPARIN SODIUM 5000 UNIT(S): 5000 INJECTION INTRAVENOUS; SUBCUTANEOUS at 06:44

## 2020-02-01 NOTE — OB RN DELIVERY SUMMARY - NS_DELIVERYCRNA_OBGYN_ALL_OB_FT
11/5/19 2018 spoke with mom re: rvp results will continue supportive care Pauly Cruz MS, RN, CPNP-PC Monse MARIE

## 2020-02-01 NOTE — LACTATION INITIAL EVALUATION - NS LACT CON REASON FOR REQ
mother  states  gave  formula  but  would  like to nurse  now  .  nbn  just fed  20 ml  of  formula  and  mother  educated. instructed  to offer both  breast at a feeding ,feed on cue and safe  skin to skin.  reviewed  new beginnings  section  breastfeeding  section.  reviewed  breastfeeding  log  and daily  nbn  goals. encouraged  to ask  for  assistance  as needed./primaparous mom/general questions without assessment

## 2020-02-01 NOTE — OB NEONATOLOGY/PEDIATRICIAN DELIVERY SUMMARY - NSPEDSNEONOTESA_OBGYN_ALL_OB_FT
40.6 wk female born via primary CS for arrest of descent to a 33 y/o  mother blood type O+. Maternal history significant for TOP with D&C x1. Prenatal history uncomplicated. PNL neg, NR, and immune. GBS neg on . AROM at 0930 on , clear fluids. Baby born vigorous and crying spontaneously. WDSS. Apgars 9/9. EOS 0.76. Mom plans to breastfeed, would like hepB.

## 2020-02-01 NOTE — OB PROVIDER DELIVERY SUMMARY - NSPROVIDERDELIVERYNOTE_OBGYN_ALL_OB_FT
Pregnancy at term  failure to dilate  primary CS-LST live female  normal  tubes and ovaries, small fibroids     IVF 1500  Urine 125  Mother and baby in stable condition to PACU  PATRICIA Duke

## 2020-02-01 NOTE — OB RN DELIVERY SUMMARY - NS_SEPSISRSKCALC_OBGYN_ALL_OB_FT
EOS calculated successfully. EOS Risk Factor: 0.5/1000 live births ([facility] national incidence); GA=41w;Temp=100.22; ROM=15.033; GBS='Negative'; Antibiotics='No antibiotics or any antibiotics < 2 hrs prior to birth'

## 2020-02-02 LAB
BASOPHILS # BLD AUTO: 0.05 K/UL — SIGNIFICANT CHANGE UP (ref 0–0.2)
BASOPHILS NFR BLD AUTO: 0.3 % — SIGNIFICANT CHANGE UP (ref 0–2)
EOSINOPHIL # BLD AUTO: 0.09 K/UL — SIGNIFICANT CHANGE UP (ref 0–0.5)
EOSINOPHIL NFR BLD AUTO: 0.6 % — SIGNIFICANT CHANGE UP (ref 0–6)
HCT VFR BLD CALC: 26 % — LOW (ref 34.5–45)
HGB BLD-MCNC: 8.1 G/DL — LOW (ref 11.5–15.5)
IMM GRANULOCYTES NFR BLD AUTO: 0.6 % — SIGNIFICANT CHANGE UP (ref 0–1.5)
LYMPHOCYTES # BLD AUTO: 1.34 K/UL — SIGNIFICANT CHANGE UP (ref 1–3.3)
LYMPHOCYTES # BLD AUTO: 9 % — LOW (ref 13–44)
MCHC RBC-ENTMCNC: 30 PG — SIGNIFICANT CHANGE UP (ref 27–34)
MCHC RBC-ENTMCNC: 31.2 % — LOW (ref 32–36)
MCV RBC AUTO: 96.3 FL — SIGNIFICANT CHANGE UP (ref 80–100)
MONOCYTES # BLD AUTO: 0.93 K/UL — HIGH (ref 0–0.9)
MONOCYTES NFR BLD AUTO: 6.2 % — SIGNIFICANT CHANGE UP (ref 2–14)
NEUTROPHILS # BLD AUTO: 12.45 K/UL — HIGH (ref 1.8–7.4)
NEUTROPHILS NFR BLD AUTO: 83.3 % — HIGH (ref 43–77)
NRBC # FLD: 0 K/UL — SIGNIFICANT CHANGE UP (ref 0–0)
PLATELET # BLD AUTO: 209 K/UL — SIGNIFICANT CHANGE UP (ref 150–400)
PMV BLD: 10.9 FL — SIGNIFICANT CHANGE UP (ref 7–13)
RBC # BLD: 2.7 M/UL — LOW (ref 3.8–5.2)
RBC # FLD: 14.4 % — SIGNIFICANT CHANGE UP (ref 10.3–14.5)
WBC # BLD: 14.95 K/UL — HIGH (ref 3.8–10.5)
WBC # FLD AUTO: 14.95 K/UL — HIGH (ref 3.8–10.5)

## 2020-02-02 RX ORDER — ASCORBIC ACID 60 MG
500 TABLET,CHEWABLE ORAL DAILY
Refills: 0 | Status: DISCONTINUED | OUTPATIENT
Start: 2020-02-02 | End: 2020-02-05

## 2020-02-02 RX ORDER — OXYCODONE HYDROCHLORIDE 5 MG/1
5 TABLET ORAL ONCE
Refills: 0 | Status: DISCONTINUED | OUTPATIENT
Start: 2020-02-02 | End: 2020-02-02

## 2020-02-02 RX ORDER — SENNA PLUS 8.6 MG/1
2 TABLET ORAL AT BEDTIME
Refills: 0 | Status: DISCONTINUED | OUTPATIENT
Start: 2020-02-02 | End: 2020-02-04

## 2020-02-02 RX ORDER — FERROUS SULFATE 325(65) MG
325 TABLET ORAL THREE TIMES A DAY
Refills: 0 | Status: DISCONTINUED | OUTPATIENT
Start: 2020-02-02 | End: 2020-02-05

## 2020-02-02 RX ORDER — OXYCODONE HYDROCHLORIDE 5 MG/1
5 TABLET ORAL
Refills: 0 | Status: DISCONTINUED | OUTPATIENT
Start: 2020-02-02 | End: 2020-02-05

## 2020-02-02 RX ORDER — IBUPROFEN 200 MG
600 TABLET ORAL EVERY 6 HOURS
Refills: 0 | Status: DISCONTINUED | OUTPATIENT
Start: 2020-02-02 | End: 2020-02-05

## 2020-02-02 RX ADMIN — Medication 600 MILLIGRAM(S): at 18:00

## 2020-02-02 RX ADMIN — OXYCODONE HYDROCHLORIDE 5 MILLIGRAM(S): 5 TABLET ORAL at 09:39

## 2020-02-02 RX ADMIN — Medication 600 MILLIGRAM(S): at 05:00

## 2020-02-02 RX ADMIN — Medication 325 MILLIGRAM(S): at 20:27

## 2020-02-02 RX ADMIN — SENNA PLUS 2 TABLET(S): 8.6 TABLET ORAL at 20:27

## 2020-02-02 RX ADMIN — Medication 975 MILLIGRAM(S): at 11:09

## 2020-02-02 RX ADMIN — SIMETHICONE 80 MILLIGRAM(S): 80 TABLET, CHEWABLE ORAL at 20:26

## 2020-02-02 RX ADMIN — HEPARIN SODIUM 5000 UNIT(S): 5000 INJECTION INTRAVENOUS; SUBCUTANEOUS at 17:23

## 2020-02-02 RX ADMIN — Medication 600 MILLIGRAM(S): at 11:43

## 2020-02-02 RX ADMIN — Medication 325 MILLIGRAM(S): at 15:54

## 2020-02-02 RX ADMIN — MAGNESIUM HYDROXIDE 30 MILLILITER(S): 400 TABLET, CHEWABLE ORAL at 09:41

## 2020-02-02 RX ADMIN — OXYCODONE HYDROCHLORIDE 5 MILLIGRAM(S): 5 TABLET ORAL at 16:30

## 2020-02-02 RX ADMIN — OXYCODONE HYDROCHLORIDE 5 MILLIGRAM(S): 5 TABLET ORAL at 14:07

## 2020-02-02 RX ADMIN — Medication 975 MILLIGRAM(S): at 00:40

## 2020-02-02 RX ADMIN — OXYCODONE HYDROCHLORIDE 5 MILLIGRAM(S): 5 TABLET ORAL at 04:52

## 2020-02-02 RX ADMIN — OXYCODONE HYDROCHLORIDE 5 MILLIGRAM(S): 5 TABLET ORAL at 13:28

## 2020-02-02 RX ADMIN — OXYCODONE HYDROCHLORIDE 5 MILLIGRAM(S): 5 TABLET ORAL at 06:15

## 2020-02-02 RX ADMIN — OXYCODONE HYDROCHLORIDE 5 MILLIGRAM(S): 5 TABLET ORAL at 03:40

## 2020-02-02 RX ADMIN — Medication 600 MILLIGRAM(S): at 05:30

## 2020-02-02 RX ADMIN — Medication 600 MILLIGRAM(S): at 17:22

## 2020-02-02 RX ADMIN — Medication 975 MILLIGRAM(S): at 20:25

## 2020-02-02 RX ADMIN — SIMETHICONE 80 MILLIGRAM(S): 80 TABLET, CHEWABLE ORAL at 15:54

## 2020-02-02 RX ADMIN — Medication 975 MILLIGRAM(S): at 06:46

## 2020-02-02 RX ADMIN — OXYCODONE HYDROCHLORIDE 5 MILLIGRAM(S): 5 TABLET ORAL at 10:15

## 2020-02-02 RX ADMIN — SIMETHICONE 80 MILLIGRAM(S): 80 TABLET, CHEWABLE ORAL at 09:41

## 2020-02-02 RX ADMIN — Medication 975 MILLIGRAM(S): at 06:15

## 2020-02-02 RX ADMIN — OXYCODONE HYDROCHLORIDE 5 MILLIGRAM(S): 5 TABLET ORAL at 21:00

## 2020-02-02 RX ADMIN — Medication 975 MILLIGRAM(S): at 11:43

## 2020-02-02 RX ADMIN — OXYCODONE HYDROCHLORIDE 5 MILLIGRAM(S): 5 TABLET ORAL at 06:46

## 2020-02-02 RX ADMIN — OXYCODONE HYDROCHLORIDE 5 MILLIGRAM(S): 5 TABLET ORAL at 03:10

## 2020-02-02 RX ADMIN — OXYCODONE HYDROCHLORIDE 5 MILLIGRAM(S): 5 TABLET ORAL at 15:55

## 2020-02-02 RX ADMIN — Medication 500 MILLIGRAM(S): at 15:54

## 2020-02-02 RX ADMIN — OXYCODONE HYDROCHLORIDE 5 MILLIGRAM(S): 5 TABLET ORAL at 20:25

## 2020-02-02 RX ADMIN — OXYCODONE HYDROCHLORIDE 5 MILLIGRAM(S): 5 TABLET ORAL at 00:40

## 2020-02-02 RX ADMIN — Medication 600 MILLIGRAM(S): at 11:06

## 2020-02-02 RX ADMIN — Medication 975 MILLIGRAM(S): at 21:00

## 2020-02-02 RX ADMIN — OXYCODONE HYDROCHLORIDE 5 MILLIGRAM(S): 5 TABLET ORAL at 05:30

## 2020-02-02 NOTE — PROGRESS NOTE ADULT - SUBJECTIVE AND OBJECTIVE BOX
OB Progress Note:  Delivery, POD#1    S: 33yo POD#1 s/p LTCS . Her pain is well controlled. She is tolerating a regular diet. She has not yet passed flatus. Denies N/V. Denies CP/SOB/lightheadedness/dizziness. She is ambulating without difficulty. Voiding spontaneously. Denies heavy vaginal bleeding.    O:   Vital Signs Last 24 Hrs  T(C): 36.7 (2020 05:59), Max: 36.9 (2020 22:00)  T(F): 98.1 (2020 05:59), Max: 98.5 (2020 22:00)  HR: 96 (2020 05:59) (76 - 102)  BP: 102/58 (2020 05:59) (102/58 - 120/66)  BP(mean): --  RR: 18 (2020 05:59) (16 - 18)  SpO2: 100% (2020 05:59) (96% - 100%)    Labs:  Blood type: O Positive  Rubella IgG: RPR: Negative                          8.1<L>   14.95<H> >-----------< 209    (  @ 06:16 )             26.0<L>                        10.4<L>   12.09<H> >-----------< 234    (  @ 05:35 )             32.6<L>                        9.5<L>   8.44 >-----------< 226    (  @ 18:15 )             29.9<L>                  PE:  General: NAD  Abdomen: Mildly distended, appropriately tender, Fundus firm, incision c/d/i.  VE: No heavy vaginal bleeding  Extremities: No erythema, no pitting edema

## 2020-02-02 NOTE — PROGRESS NOTE ADULT - SUBJECTIVE AND OBJECTIVE BOX
She is a  34y woman who is now post-operative day 1:     Subjective:  The patient feels well.  She is ambulating.   She is tolerating regular diet.  She denies nausea and vomiting.  She is voiding.  Her pain is controlled.  She reports normal postpartum bleeding.      Objective:  Vital Signs Last 24 Hrs  T(C): 36.6 (2020 01:37), Max: 36.9 (2020 22:00)  T(F): 97.8 (:37), Max: 98.5 (2020 22:00)  HR: 76 (:37) (76 - 102)  BP: 120/66 (:37) (104/57 - 124/70)  BP(mean): --  RR: 18 (:37) (16 - 18)  SpO2: 100% (:37) (96% - 100%)    Constitutional: NAD  Abdomen: Soft, nontender, no distension, firm uterine fundus  Incision: Clean, dry, and intact  Pelvic: Normal lochia noted  Ext: No calf tenderness bilaterally    LABS:                        10.4   12.09 )-----------( 234      ( 2020 05:35 )             32.6                         9.5    8.44  )-----------( 226      ( 2020 18:15 )             29.9         Allergies    No Known Drug Allergies  peanuts (Anaphylaxis)    Intolerances      MEDICATIONS  (STANDING):  acetaminophen   Tablet .. 975 milliGRAM(s) Oral <User Schedule>  diphtheria/tetanus/pertussis (acellular) Vaccine (ADAcel) 0.5 milliLiter(s) IntraMuscular once  heparin  Injectable 5000 Unit(s) SubCutaneous every 12 hours  ibuprofen  Tablet. 600 milliGRAM(s) Oral every 6 hours    MEDICATIONS  (PRN):  diphenhydrAMINE 25 milliGRAM(s) Oral every 6 hours PRN Itching  glycerin Suppository - Adult 1 Suppository(s) Rectal at bedtime PRN Constipation  lanolin Ointment 1 Application(s) Topical every 6 hours PRN Sore Nipples  magnesium hydroxide Suspension 30 milliLiter(s) Oral two times a day PRN Constipation  oxyCODONE    IR 5 milliGRAM(s) Oral every 3 hours PRN Moderate to Severe Pain (4-10)  oxyCODONE    IR 5 milliGRAM(s) Oral once PRN Moderate to Severe Pain (4-10)  simethicone 80 milliGRAM(s) Chew every 4 hours PRN Gas        Assessment and Plan  Pt stable POD #1 s/p  section  -continues postoperative and postpartum care  -encourage ambulation

## 2020-02-02 NOTE — PROGRESS NOTE ADULT - SUBJECTIVE AND OBJECTIVE BOX
Postop Day  __1_ s/p   C- Section    THERAPY:  [  ] Spinal morphine   [ x ] Epidural morphine   [  ] IV PCA Hydromorphone 1 mg/ml      Sedation Score:	  [x  ] Alert	    [  ] Drowsy        [  ] Arousable	[  ] Asleep	[  ] Unresponsive    Side Effects:	  [ x ] None	     [  ] Nausea        [  ] Pruritus        [  ] Weakness   [  ] Numbness        ASSESSMENT/ PLAN   [   ] Discontinue         [  ] Continue  [ x ]Documentation and Verification of current medications     Comments:       Patient is doing well.  OOBAA. Tolerating clears.  Pain is tolerable.  No residual anesthetic issues or complications noted.

## 2020-02-03 RX ORDER — HYDROMORPHONE HYDROCHLORIDE 2 MG/ML
2 INJECTION INTRAMUSCULAR; INTRAVENOUS; SUBCUTANEOUS ONCE
Refills: 0 | Status: DISCONTINUED | OUTPATIENT
Start: 2020-02-03 | End: 2020-02-03

## 2020-02-03 RX ORDER — KETOROLAC TROMETHAMINE 30 MG/ML
30 SYRINGE (ML) INJECTION ONCE
Refills: 0 | Status: DISCONTINUED | OUTPATIENT
Start: 2020-02-03 | End: 2020-02-03

## 2020-02-03 RX ADMIN — OXYCODONE HYDROCHLORIDE 5 MILLIGRAM(S): 5 TABLET ORAL at 03:00

## 2020-02-03 RX ADMIN — OXYCODONE HYDROCHLORIDE 5 MILLIGRAM(S): 5 TABLET ORAL at 10:28

## 2020-02-03 RX ADMIN — Medication 325 MILLIGRAM(S): at 15:33

## 2020-02-03 RX ADMIN — OXYCODONE HYDROCHLORIDE 5 MILLIGRAM(S): 5 TABLET ORAL at 07:00

## 2020-02-03 RX ADMIN — Medication 500 MILLIGRAM(S): at 15:33

## 2020-02-03 RX ADMIN — HEPARIN SODIUM 5000 UNIT(S): 5000 INJECTION INTRAVENOUS; SUBCUTANEOUS at 06:17

## 2020-02-03 RX ADMIN — Medication 600 MILLIGRAM(S): at 06:17

## 2020-02-03 RX ADMIN — SIMETHICONE 80 MILLIGRAM(S): 80 TABLET, CHEWABLE ORAL at 15:33

## 2020-02-03 RX ADMIN — Medication 600 MILLIGRAM(S): at 07:00

## 2020-02-03 RX ADMIN — Medication 30 MILLIGRAM(S): at 22:50

## 2020-02-03 RX ADMIN — OXYCODONE HYDROCHLORIDE 5 MILLIGRAM(S): 5 TABLET ORAL at 15:33

## 2020-02-03 RX ADMIN — OXYCODONE HYDROCHLORIDE 5 MILLIGRAM(S): 5 TABLET ORAL at 10:58

## 2020-02-03 RX ADMIN — HYDROMORPHONE HYDROCHLORIDE 2 MILLIGRAM(S): 2 INJECTION INTRAMUSCULAR; INTRAVENOUS; SUBCUTANEOUS at 16:30

## 2020-02-03 RX ADMIN — Medication 975 MILLIGRAM(S): at 02:59

## 2020-02-03 RX ADMIN — SENNA PLUS 2 TABLET(S): 8.6 TABLET ORAL at 21:49

## 2020-02-03 RX ADMIN — Medication 975 MILLIGRAM(S): at 10:27

## 2020-02-03 RX ADMIN — OXYCODONE HYDROCHLORIDE 5 MILLIGRAM(S): 5 TABLET ORAL at 16:06

## 2020-02-03 RX ADMIN — Medication 600 MILLIGRAM(S): at 15:33

## 2020-02-03 RX ADMIN — HEPARIN SODIUM 5000 UNIT(S): 5000 INJECTION INTRAVENOUS; SUBCUTANEOUS at 16:29

## 2020-02-03 RX ADMIN — OXYCODONE HYDROCHLORIDE 5 MILLIGRAM(S): 5 TABLET ORAL at 01:00

## 2020-02-03 RX ADMIN — Medication 600 MILLIGRAM(S): at 00:00

## 2020-02-03 RX ADMIN — Medication 975 MILLIGRAM(S): at 10:57

## 2020-02-03 RX ADMIN — Medication 975 MILLIGRAM(S): at 03:50

## 2020-02-03 RX ADMIN — OXYCODONE HYDROCHLORIDE 5 MILLIGRAM(S): 5 TABLET ORAL at 00:00

## 2020-02-03 RX ADMIN — HYDROMORPHONE HYDROCHLORIDE 2 MILLIGRAM(S): 2 INJECTION INTRAMUSCULAR; INTRAVENOUS; SUBCUTANEOUS at 17:00

## 2020-02-03 RX ADMIN — Medication 325 MILLIGRAM(S): at 21:49

## 2020-02-03 RX ADMIN — Medication 30 MILLIGRAM(S): at 21:48

## 2020-02-03 RX ADMIN — OXYCODONE HYDROCHLORIDE 5 MILLIGRAM(S): 5 TABLET ORAL at 06:17

## 2020-02-03 RX ADMIN — SIMETHICONE 80 MILLIGRAM(S): 80 TABLET, CHEWABLE ORAL at 06:16

## 2020-02-03 RX ADMIN — Medication 600 MILLIGRAM(S): at 01:00

## 2020-02-03 RX ADMIN — Medication 325 MILLIGRAM(S): at 06:16

## 2020-02-03 RX ADMIN — OXYCODONE HYDROCHLORIDE 5 MILLIGRAM(S): 5 TABLET ORAL at 04:00

## 2020-02-03 RX ADMIN — Medication 600 MILLIGRAM(S): at 16:06

## 2020-02-03 NOTE — PROGRESS NOTE ADULT - SUBJECTIVE AND OBJECTIVE BOX
Patient was asleep on rounds this morning.  Vandana asked that I come back later.  VSS, Afebrile  Stable per RN, [facility].

## 2020-02-03 NOTE — PROGRESS NOTE ADULT - SUBJECTIVE AND OBJECTIVE BOX
SUBJECTIVE:    Pain: Controlled    Complaints: C/O Abdominal  pain,  and burning and incisional pain    MILESTONES:    Alert and Oriented x 3  [ x ]  Out of bed/ ambulating. [ x ]  Flatus:   Positive [ x ]  Negative [  ]  Bowel movement  [  ] Positive [  ] Negative   Voiding [x  ] Due to void [  ]   Rodriguez/Indwelling catheter in place [  ]  Diet: Regular [ x ]  Clears [  ]  NPO [  ]    Infant feeding:  Breast [  ]   Bottle [  ]  Both [ X ]  Feeding related issues and/or concerns:      OBJECTIVE:  T(C): 36.7 (20 @ 14:00), Max: 36.7 (20 @ 14:00)  HR: 78 (20 @ 14:00) (73 - 93)  BP: 117/69 (20 @ 14:00) (115/57 - 122/66)  RR: 18 (20 @ 14:00) (17 - 18)  SpO2: 99% (20 @ 14:00) (97% - 100%)  Wt(kg): --                        8.1    14.95 )-----------( 209      ( 2020 06:16 )             26.0           Blood Type: O Positive    RPR: Negative          MEDICATIONS  (STANDING):  acetaminophen   Tablet .. 975 milliGRAM(s) Oral <User Schedule>  ascorbic acid 500 milliGRAM(s) Oral daily  diphtheria/tetanus/pertussis (acellular) Vaccine (ADAcel) 0.5 milliLiter(s) IntraMuscular once  ferrous    sulfate 325 milliGRAM(s) Oral three times a day  heparin  Injectable 5000 Unit(s) SubCutaneous every 12 hours  ibuprofen  Tablet. 600 milliGRAM(s) Oral every 6 hours  ketorolac   Injectable 30 milliGRAM(s) IntraMuscular once  senna 2 Tablet(s) Oral at bedtime    MEDICATIONS  (PRN):  diphenhydrAMINE 25 milliGRAM(s) Oral every 6 hours PRN Itching  glycerin Suppository - Adult 1 Suppository(s) Rectal at bedtime PRN Constipation  HYDROmorphone   Tablet 2 milliGRAM(s) Oral once PRN Severe Pain (7 - 10)  lanolin Ointment 1 Application(s) Topical every 6 hours PRN Sore Nipples  magnesium hydroxide Suspension 30 milliLiter(s) Oral two times a day PRN Constipation  oxyCODONE    IR 5 milliGRAM(s) Oral every 3 hours PRN Moderate to Severe Pain (4-10)  oxyCODONE    IR 5 milliGRAM(s) Oral once PRN Moderate to Severe Pain (4-10)  simethicone 80 milliGRAM(s) Chew every 4 hours PRN Gas        ASSESSMENT:    34y     G  1    P   1001      PO Day#  2        Delivery: Primary [ X ]    Repeat [  ]     QBL - 267                                    Indication of procedure: Arrest    Condition: Stable    Past Medical History significant for: HPI:  35y/o  @40.6wks presents with painful contractions, pain scale 8/10.  Reports good fetal movement  Denies LOF/VB    Allergies: Peanuts- anaphylaxis  Medications: PNV    Denies Medical and Surgical HX  Denies Psy/Etoh/Smoke/Drugs (2020 04:34)      Current Issues:    Breasts:  Soft [x  ]   Engorged [  ]  Nipples:  Abdomen: Soft [ x ]   Distended [  ] Nontender [  ]     Bowel sounds :  Present [  ]  Absent [  ]   Fundus:  Firm [x  ]  Boggy [  ]    Abdominal incision: Clean, Dry and Intact [x  ]  Staples [  ] Steri Strips [ X ] Dermabond [  ] Sutures [  ]    Patient wearing abdominal binder for support.    Vaginal: Lochia:  Heavy [  ]  Moderate [ x ]   Scant [  ]    Extremities: Edema [ X ] Negative Tristin's Sign [ X ] Nontender Darwin  [ x ] Positive pedal pulses [  ]    Other relevant physical exam findings:      PLAN:    Plan: Increase ambulation, analgesia PRN and pain medication protocol standing oxycodone, ibuprofen and acetaminophen.    Diet: Regular diet    Continue routine post-operative and postpartum care. On Iron for Anemia.  OOB, Ambulation, Mylicon, MOM, and Senna as needed.  SW Consult as patient is very teary and emotional today and in need of ways to cope with everything - C/S, Pain, taking care of the baby, etc.   Allowed to verbalize feelings and is agreeable to seeing SW.  Patient is requesting to stay the extra day.   Dr. Castellon in to see patient.,  Will be discharged on PO Day #4.  Continue to follow.    Discharge Planning [  X  ]    For discharge Today  [    ]    Consults:  Social Work [ X ]  Lactation [ x ]  Other [         ]

## 2020-02-03 NOTE — PROGRESS NOTE ADULT - SUBJECTIVE AND OBJECTIVE BOX
She is a  34y woman who is now post-operative day: 2    Subjective:  Pt c/o pain not well controlled.  Just had motrin.  Getting little relief, reports pain 7/10.  Teary, states feels like she can't take care of baby properly because of pain.  Partner has been helpful when at bedside.  +ambulating.  +void.  Tolerating regular diet. +flatus.  No nausea/vomiting. Lochia WNL.    Objective:  Vital Signs Last 24 Hrs  T(C): 36.7 (2020 14:00), Max: 36.7 (2020 14:00)  T(F): 98.1 (2020 14:00), Max: 98.1 (2020 14:00)  HR: 78 (2020 14:00) (73 - 93)  BP: 117/69 (2020 14:00) (115/57 - 122/66)  BP(mean): --  RR: 18 (2020 14:00) (17 - 18)  SpO2: 99% (2020 14:00) (97% - 100%)    Constitutional: NAD  Breast: Soft, nontender, not engorged.  Abdomen: Soft, nontender, soft distension.  Uterine fundus firm, non-tender.  Incision: Clean, dry, and intact  Ext: No calf tenderness bilaterally, negative Tristin's sign    LABS:                        8.1    14.95 )-----------( 209      ( 2020 06:16 )             26.0                         10.4   12.09 )-----------( 234      ( 2020 05:35 )             32.6                         9.5    8.44  )-----------( 226      ( 2020 18:15 )             29.9     ABO Interpretation: O ( @ 06:41)  Rh Interpretation: Positive ( @ 06:41)  Treponema Pallidum Antibody Interpretation: Negative ( @ 05:35)  Treponema Pallidum Antibody Interpretation: Negative ( @ 18:15)  ABO Interpretation: O ( @ 18:14)  Rh Interpretation: Positive ( @ 18:14)  Antibody Screen: Negative ( @ 18:14)        Allergies    No Known Drug Allergies  peanuts (Anaphylaxis)    Intolerances      MEDICATIONS  (STANDING):  acetaminophen   Tablet .. 975 milliGRAM(s) Oral <User Schedule>  ascorbic acid 500 milliGRAM(s) Oral daily  diphtheria/tetanus/pertussis (acellular) Vaccine (ADAcel) 0.5 milliLiter(s) IntraMuscular once  ferrous    sulfate 325 milliGRAM(s) Oral three times a day  heparin  Injectable 5000 Unit(s) SubCutaneous every 12 hours  ibuprofen  Tablet. 600 milliGRAM(s) Oral every 6 hours  ketorolac   Injectable 30 milliGRAM(s) IntraMuscular once  senna 2 Tablet(s) Oral at bedtime    MEDICATIONS  (PRN):  diphenhydrAMINE 25 milliGRAM(s) Oral every 6 hours PRN Itching  glycerin Suppository - Adult 1 Suppository(s) Rectal at bedtime PRN Constipation  HYDROmorphone   Tablet 2 milliGRAM(s) Oral once PRN Severe Pain (7 - 10)  lanolin Ointment 1 Application(s) Topical every 6 hours PRN Sore Nipples  magnesium hydroxide Suspension 30 milliLiter(s) Oral two times a day PRN Constipation  oxyCODONE    IR 5 milliGRAM(s) Oral every 3 hours PRN Moderate to Severe Pain (4-10)  oxyCODONE    IR 5 milliGRAM(s) Oral once PRN Moderate to Severe Pain (4-10)  simethicone 80 milliGRAM(s) Chew every 4 hours PRN Gas        Assessment and Plan  Pt stable POD #2 s/p  section  -continue routine postoperative and postpartum care  -encourage ambulation.  abdominal binder.  -analgesia prn.  for dilaudid PO x1, toradol IM when next motrin due.    -social work consult  -discharge planning for POD#4.    MD Ankur

## 2020-02-04 RX ORDER — ACETAMINOPHEN 500 MG
3 TABLET ORAL
Qty: 0 | Refills: 0 | DISCHARGE
Start: 2020-02-04

## 2020-02-04 RX ORDER — SENNA PLUS 8.6 MG/1
1 TABLET ORAL
Refills: 0 | Status: DISCONTINUED | OUTPATIENT
Start: 2020-02-04 | End: 2020-02-05

## 2020-02-04 RX ORDER — IBUPROFEN 200 MG
1 TABLET ORAL
Qty: 0 | Refills: 0 | DISCHARGE
Start: 2020-02-04

## 2020-02-04 RX ORDER — HYDROMORPHONE HYDROCHLORIDE 2 MG/ML
2 INJECTION INTRAMUSCULAR; INTRAVENOUS; SUBCUTANEOUS ONCE
Refills: 0 | Status: DISCONTINUED | OUTPATIENT
Start: 2020-02-04 | End: 2020-02-04

## 2020-02-04 RX ADMIN — OXYCODONE HYDROCHLORIDE 5 MILLIGRAM(S): 5 TABLET ORAL at 00:03

## 2020-02-04 RX ADMIN — SIMETHICONE 80 MILLIGRAM(S): 80 TABLET, CHEWABLE ORAL at 10:48

## 2020-02-04 RX ADMIN — Medication 600 MILLIGRAM(S): at 17:47

## 2020-02-04 RX ADMIN — Medication 975 MILLIGRAM(S): at 00:03

## 2020-02-04 RX ADMIN — OXYCODONE HYDROCHLORIDE 5 MILLIGRAM(S): 5 TABLET ORAL at 21:18

## 2020-02-04 RX ADMIN — Medication 975 MILLIGRAM(S): at 21:48

## 2020-02-04 RX ADMIN — Medication 600 MILLIGRAM(S): at 07:34

## 2020-02-04 RX ADMIN — Medication 975 MILLIGRAM(S): at 00:04

## 2020-02-04 RX ADMIN — Medication 325 MILLIGRAM(S): at 06:17

## 2020-02-04 RX ADMIN — Medication 975 MILLIGRAM(S): at 14:24

## 2020-02-04 RX ADMIN — SENNA PLUS 1 TABLET(S): 8.6 TABLET ORAL at 10:48

## 2020-02-04 RX ADMIN — Medication 975 MILLIGRAM(S): at 21:18

## 2020-02-04 RX ADMIN — Medication 600 MILLIGRAM(S): at 10:48

## 2020-02-04 RX ADMIN — Medication 975 MILLIGRAM(S): at 14:50

## 2020-02-04 RX ADMIN — Medication 600 MILLIGRAM(S): at 11:30

## 2020-02-04 RX ADMIN — Medication 325 MILLIGRAM(S): at 21:19

## 2020-02-04 RX ADMIN — OXYCODONE HYDROCHLORIDE 5 MILLIGRAM(S): 5 TABLET ORAL at 07:34

## 2020-02-04 RX ADMIN — Medication 600 MILLIGRAM(S): at 06:18

## 2020-02-04 RX ADMIN — HEPARIN SODIUM 5000 UNIT(S): 5000 INJECTION INTRAVENOUS; SUBCUTANEOUS at 18:36

## 2020-02-04 RX ADMIN — HEPARIN SODIUM 5000 UNIT(S): 5000 INJECTION INTRAVENOUS; SUBCUTANEOUS at 06:17

## 2020-02-04 RX ADMIN — OXYCODONE HYDROCHLORIDE 5 MILLIGRAM(S): 5 TABLET ORAL at 01:05

## 2020-02-04 RX ADMIN — OXYCODONE HYDROCHLORIDE 5 MILLIGRAM(S): 5 TABLET ORAL at 06:17

## 2020-02-04 RX ADMIN — Medication 600 MILLIGRAM(S): at 01:05

## 2020-02-04 RX ADMIN — HYDROMORPHONE HYDROCHLORIDE 2 MILLIGRAM(S): 2 INJECTION INTRAMUSCULAR; INTRAVENOUS; SUBCUTANEOUS at 02:37

## 2020-02-04 RX ADMIN — Medication 600 MILLIGRAM(S): at 10:49

## 2020-02-04 RX ADMIN — Medication 600 MILLIGRAM(S): at 18:34

## 2020-02-04 RX ADMIN — HYDROMORPHONE HYDROCHLORIDE 2 MILLIGRAM(S): 2 INJECTION INTRAMUSCULAR; INTRAVENOUS; SUBCUTANEOUS at 03:45

## 2020-02-04 NOTE — PROGRESS NOTE ADULT - SUBJECTIVE AND OBJECTIVE BOX
Patient was  asleep on rounds this morning.   /Partner asked that I come back a little later, so that she can rest.  VSS, Afebrile  Continue Post Op care.  Will check back later.

## 2020-02-04 NOTE — PROGRESS NOTE ADULT - SUBJECTIVE AND OBJECTIVE BOX
SUBJECTIVE:    Pain: Controlled    Complaints:  C/O Gas pain    MILESTONES:    Alert and Oriented x 3  [ x ]  Out of bed/ ambulating. [ x ]  Flatus:   Positive [ x ]  Negative [  ]  Bowel movement  [  ] Positive [  ] Negative   Voiding [x  ] Due to void [  ]   Rodriguez/Indwelling catheter in place [  ]  Diet: Regular [ x ]  Clears [  ]  NPO [  ]    Infant feeding:  Breast [  ]   Bottle [ X ]  Both [  ]  Feeding related issues and/or concerns:      OBJECTIVE:  T(C): 36.7 (20 @ 05:12), Max: 36.8 (20 @ 23:05)  HR: 74 (20 @ 05:12) (74 - 92)  BP: 123/76 (20 @ 05:12) (113/57 - 123/76)  RR: 16 (20 @ 05:12) (16 - 18)  SpO2: 100% (20 @ 05:12) (99% - 100%)  Wt(kg): --          Blood Type: O Positive    RPR: Negative          MEDICATIONS  (STANDING):  acetaminophen   Tablet .. 975 milliGRAM(s) Oral <User Schedule>  ascorbic acid 500 milliGRAM(s) Oral daily  diphtheria/tetanus/pertussis (acellular) Vaccine (ADAcel) 0.5 milliLiter(s) IntraMuscular once  ferrous    sulfate 325 milliGRAM(s) Oral three times a day  heparin  Injectable 5000 Unit(s) SubCutaneous every 12 hours  ibuprofen  Tablet. 600 milliGRAM(s) Oral every 6 hours    MEDICATIONS  (PRN):  diphenhydrAMINE 25 milliGRAM(s) Oral every 6 hours PRN Itching  glycerin Suppository - Adult 1 Suppository(s) Rectal at bedtime PRN Constipation  lanolin Ointment 1 Application(s) Topical every 6 hours PRN Sore Nipples  magnesium hydroxide Suspension 30 milliLiter(s) Oral two times a day PRN Constipation  oxyCODONE    IR 5 milliGRAM(s) Oral every 3 hours PRN Moderate to Severe Pain (4-10)  oxyCODONE    IR 5 milliGRAM(s) Oral once PRN Moderate to Severe Pain (4-10)  senna 1 Tablet(s) Oral two times a day PRN Constipation  simethicone 80 milliGRAM(s) Chew every 4 hours PRN Gas        ASSESSMENT:    34y     G  2    P  1011       PO Day#  33        Delivery: Primary [X  ]    Repeat [  ]                                         Indication of procedure: Arrest    Condition: Stable    Past Medical History significant for: HPI:  35y/o  @40.6wks presents with painful contractions, pain scale 8/10.  Reports good fetal movement  Denies LOF/VB    Allergies: Peanuts- anaphylaxis  Medications: PNV    Denies Medical and Surgical HX  Denies Psy/Etoh/Smoke/Drugs (2020 04:34)      Current Issues:    Breasts:  Soft [x  ]   Engorged [  ]  Nipples:  Abdomen: Soft [ x ]   Distended [  ] Nontender [  ]     Bowel sounds :  Present [  ]  Absent [  ]   Fundus:  Firm [x  ]  Boggy [  ]    Abdominal incision: Clean, Dry and Intact [x  ]  Staples [  ] Steri Strips [X  ] Dermabond [  ] Sutures [  ]    Patient wearing abdominal binder for support.    Vaginal: Lochia:  Heavy [  ]  Moderate [ x ]   Scant [  ]    Extremities: Edema [ X ] Negative Tristin's Sign [ X ] Nontender Darwin  [ x ] Positive pedal pulses [  ]    Other relevant physical exam findings:      PLAN:    Plan: Increase ambulation, analgesia PRN and pain medication protocol standing oxycodone, ibuprofen and acetaminophen.    Diet: Regular diet    Continue routine post-operative and postpartum care.  Patient is consistently in the bed.  OOB and Ambulation is encouraged. Mylicon, MOM, and Senna as needed.  For SW Consult.    Discharge Planning [ x ]    For discharge Today  [    ]    Consults:  Social Work [ X ]  Lactation [ x ]  Other [         ]

## 2020-02-05 VITALS
HEART RATE: 64 BPM | SYSTOLIC BLOOD PRESSURE: 114 MMHG | OXYGEN SATURATION: 100 % | TEMPERATURE: 98 F | DIASTOLIC BLOOD PRESSURE: 75 MMHG | RESPIRATION RATE: 18 BRPM

## 2020-02-05 RX ADMIN — Medication 975 MILLIGRAM(S): at 03:00

## 2020-02-05 RX ADMIN — HEPARIN SODIUM 5000 UNIT(S): 5000 INJECTION INTRAVENOUS; SUBCUTANEOUS at 05:28

## 2020-02-05 RX ADMIN — Medication 600 MILLIGRAM(S): at 00:30

## 2020-02-05 RX ADMIN — Medication 975 MILLIGRAM(S): at 02:29

## 2020-02-05 RX ADMIN — Medication 600 MILLIGRAM(S): at 06:00

## 2020-02-05 RX ADMIN — Medication 975 MILLIGRAM(S): at 10:20

## 2020-02-05 RX ADMIN — Medication 975 MILLIGRAM(S): at 12:25

## 2020-02-05 RX ADMIN — Medication 325 MILLIGRAM(S): at 05:28

## 2020-02-05 RX ADMIN — Medication 600 MILLIGRAM(S): at 00:00

## 2020-02-05 RX ADMIN — Medication 600 MILLIGRAM(S): at 05:28

## 2020-02-05 NOTE — PROGRESS NOTE ADULT - SUBJECTIVE AND OBJECTIVE BOX
SUBJECTIVE:    Pain: controlled  Complaints:none    MILESTONES:    Alert and oriented x 3  [ x ]  Out of bed/ ambulating. [ x ]  Flatus: [ x ]  Postive [  ] Negative   Bowel movement  [  ] Positive [ x ] Negative   Voiding [ x ] Due to void [  ]   Diet: Regular [x  ]  Clears [  ]  NPO [  ]  Infant feeding:  Breast [x  ]   Bottle [  ]  Both [  ]  Feeding related inssues and/or concerns:none      OBJECTIVE:  T(C): 36.6 (20 @ 05:14), Max: 36.9 (20 @ 17:56)  HR: 64 (20 @ 05:14) (64 - 79)  BP: 114/75 (20 @ 05:14) (114/75 - 126/78)  RR: 18 (20 @ 05:14) (16 - 18)  SpO2: 100% (20 @ 05:14) (99% - 100%)  Wt(kg): --    MEDICATIONS  (STANDING):  acetaminophen   Tablet .. 975 milliGRAM(s) Oral <User Schedule>  ascorbic acid 500 milliGRAM(s) Oral daily  diphtheria/tetanus/pertussis (acellular) Vaccine (ADAcel) 0.5 milliLiter(s) IntraMuscular once  ferrous    sulfate 325 milliGRAM(s) Oral three times a day  heparin  Injectable 5000 Unit(s) SubCutaneous every 12 hours  ibuprofen  Tablet. 600 milliGRAM(s) Oral every 6 hours    MEDICATIONS  (PRN):  diphenhydrAMINE 25 milliGRAM(s) Oral every 6 hours PRN Itching  glycerin Suppository - Adult 1 Suppository(s) Rectal at bedtime PRN Constipation  lanolin Ointment 1 Application(s) Topical every 6 hours PRN Sore Nipples  magnesium hydroxide Suspension 30 milliLiter(s) Oral two times a day PRN Constipation  oxyCODONE    IR 5 milliGRAM(s) Oral every 3 hours PRN Moderate to Severe Pain (4-10)  oxyCODONE    IR 5 milliGRAM(s) Oral once PRN Moderate to Severe Pain (4-10)  senna 1 Tablet(s) Oral two times a day PRN Constipation  simethicone 80 milliGRAM(s) Chew every 4 hours PRN Gas    ASSESSMENT:  34y  y/o G 2  P  1  PO Day#   5     Delivery: Primary [x  ]    Repeat [  ]                                       Indication of procedure: Arrest  Condition: Stable  Past Medical History significant for: HPI: none  Current Issues: none  Heart:      RRR                        Lungs: clear  Breasts:  Soft [x  ]   Engorged [  ]  Abdomen: Soft [ x ] , distended [  ] nontender [ x ]   Bowel sounds :  Present [ x ]  Absent [  ]   Fundus firm [ x ]  Boggy [  ]  Abdominal incision: Clean, dry and intact [x  ]  Staples [  ] Steri Strips [ x ] Dermabond [  ] Sutures [  ]  Patient wearing abdominal binder for support.  Vaginal: Lochia:  Heavy [  ]  Moderate [  ]   Scant [  x]  Extremities: Edema [2+  ] negative Tristin's Sign [x  ] Nontender Darwin  [ x ] Positive pedal pulses [ x ]  Other relevant physical exam findings: none      PLAN:  Plan: Increase ambulation, analgesia PRN and pain medication protocol standing oxycodone, ibuprofen and acetaminophen.  Diet: Regular diet  Continue routine post-operative and postpartum care.   Discharge Planning [ x ]  Consults:   Social Work [ x ]

## 2021-08-05 PROBLEM — O03.9 COMPLETE OR UNSPECIFIED SPONTANEOUS ABORTION WITHOUT COMPLICATION: Chronic | Status: ACTIVE | Noted: 2020-01-31

## 2021-08-05 PROBLEM — O22.40: Chronic | Status: ACTIVE | Noted: 2020-01-31

## 2021-08-07 ENCOUNTER — APPOINTMENT (OUTPATIENT)
Dept: DISASTER EMERGENCY | Facility: OTHER | Age: 36
End: 2021-08-07
Payer: COMMERCIAL

## 2021-08-07 PROCEDURE — 0001A: CPT

## 2021-08-28 ENCOUNTER — APPOINTMENT (OUTPATIENT)
Dept: DISASTER EMERGENCY | Facility: OTHER | Age: 36
End: 2021-08-28
Payer: COMMERCIAL

## 2021-08-28 PROCEDURE — 0002A: CPT

## 2022-05-04 NOTE — OB PST NOTE - NSANTHSNORERD_ENT_A_CORE
FMLA forms received.  Authorization Form filled out Yes.  Forms sent to [employer] Disability Dept:  Scanned into New Forms folder  Location of paperwork drop-off: [facility]  Department:      Yes

## 2023-09-18 NOTE — OB RN DELIVERY SUMMARY - NS_FORCEPSATTEMPT_OBGYN_ALL_OB
no loss of consciousness, no gait abnormality, no headache, no sensory deficits, and no weakness. Forceps were not used

## 2023-11-28 NOTE — OB PROVIDER TRIAGE NOTE - NS_ABDFINDING_OBGYN_ALL_OB
From: Brittany Aguilera  To: Dr. Telly Doshi: 2023 4:01 PM EST  Subject: Crestor    Need new rx sent. To [address] , phone [phone].    Thank you,  Brittany Aguilera
Soft, nontender

## 2024-09-23 ENCOUNTER — APPOINTMENT (OUTPATIENT)
Dept: DERMATOLOGY | Facility: CLINIC | Age: 39
End: 2024-09-23
Payer: COMMERCIAL

## 2024-09-23 VITALS — WEIGHT: 181 LBS | BODY MASS INDEX: 30.9 KG/M2 | HEIGHT: 64 IN

## 2024-09-23 DIAGNOSIS — D22.9 MELANOCYTIC NEVI, UNSPECIFIED: ICD-10-CM

## 2024-09-23 DIAGNOSIS — Z12.83 ENCOUNTER FOR SCREENING FOR MALIGNANT NEOPLASM OF SKIN: ICD-10-CM

## 2024-09-23 DIAGNOSIS — L70.0 ACNE VULGARIS: ICD-10-CM

## 2024-09-23 DIAGNOSIS — L82.1 OTHER SEBORRHEIC KERATOSIS: ICD-10-CM

## 2024-09-23 DIAGNOSIS — L30.9 DERMATITIS, UNSPECIFIED: ICD-10-CM

## 2024-09-23 PROCEDURE — G2211 COMPLEX E/M VISIT ADD ON: CPT

## 2024-09-23 PROCEDURE — 99204 OFFICE O/P NEW MOD 45 MIN: CPT

## 2024-09-23 RX ORDER — CLASCOTERONE 1 G/100G
1 CREAM TOPICAL
Qty: 1 | Refills: 5 | Status: ACTIVE | COMMUNITY
Start: 2024-09-23 | End: 1900-01-01

## 2024-09-23 RX ORDER — SPIRONOLACTONE 25 MG/1
25 TABLET ORAL
Qty: 90 | Refills: 3 | Status: ACTIVE | COMMUNITY
Start: 2024-09-23 | End: 1900-01-01

## 2024-09-23 RX ORDER — TRETINOIN 0.5 MG/G
0.05 CREAM TOPICAL
Qty: 1 | Refills: 5 | Status: ACTIVE | COMMUNITY
Start: 2024-09-23 | End: 1900-01-01

## 2024-09-23 NOTE — ASSESSMENT
[FreeTextEntry1] : #Multiple Benign Nevi #Dermatosis papulosa nigra Full body exam today. Benign findings as above. - Patient educated on ABCDEs of melanoma screening - Photoprotection reviewed including sun-protective behaviors, protective clothing, and the use of OTC broad-spectrum SPF 30+ sunscreens was advised. - RTC if develops lesions that are new, symptomatic (bleeding, itching), changing in size/color/shape  #Acne, face - Comedonal Chronic; flaring  - Reviewed expected time course of improving on topical regimen (can take 6-8 weeks for earliest signs of improvement; 3-6 months should reach maximum anticipated improvement) - CONTINUE OTC benzoyl peroxide wash 4% qAM - Wash acne prone areas nightly with mild noncomedogenic soap qPM - CONTINUE tretinoin 0.025% cream nightly. Start MWF for 1-2 weeks then increase to nightly as tolerated. Discussed proper application and SEs including but not limited to irritation and photosensitivity. Discussed pregnancy contraindication. - INCREASE spironolactone 25mg AM, 50mg PM; SED - may increase to 50mg BID in 1 month as tolerated - r/b/a spironolactone discussed including breast tenderness, electrolyte abnormalities and spotting. - Discussed how patient may consider OCP for management of acne - Referral provided to cosmetic dermatologist, Dr. Yun for evaluation for chemical peels and laser treatments - CONTINUE Winlevi to AA qAM  #Eczematous dermatitis, chronic, stable - Orientation provided about nature of condition, treatment expectations, alternatives, risks and benefits - Avoid products with fragrance, wipes. Advised switching to Vanicream brand products - CONTINUE clobetasol 0.05% ointment PRN itch to affected areas  - Side Effects were reviewed with patient including atrophy, dyspigmentation, telangiectasias, striae. Proper use reviewed including only using to affected area and avoidance of prolonged use. - Dry/gentle skin care reviewed  Patient may follow up in 3 months, or sooner PRN if any changes, new or growing lesions

## 2024-09-23 NOTE — HISTORY OF PRESENT ILLNESS
[FreeTextEntry1] : NPV: spots of concern; acne [de-identified] : Ms. BI LOPEZ is a 38 year old F who presents for: Patient endorses she previously followed with Dr. Chris Zaragoza; here to establish care.   1. Spots of concern: non-itchy, non-painful spots of unspecified age which patient would like to have evaluated 2. Acne Vulgaris x years: Using Aldactone 25mg BID, Winlevi and Tret 0.025%; the latter of which she feels may be too drying for the face. Uses Clinique moisteruizer afterwards.  Endorses acne flares in-between periods on upper lip. 3. Eczema x years, STABLE: Patient endorses she occasionally gets rough spots on her legs and feet for which she treats with Clobetasol ointment PRN.   Skin Cancer Hx: No personal history of skin cancer Family Hx: no family history of skin cancer

## 2024-09-23 NOTE — HISTORY OF PRESENT ILLNESS
[FreeTextEntry1] : NPV: spots of concern; acne [de-identified] : Ms. BI LOPEZ is a 38 year old F who presents for: Patient endorses she previously followed with Dr. Chris Zaragoza; here to establish care.   1. Spots of concern: non-itchy, non-painful spots of unspecified age which patient would like to have evaluated 2. Acne Vulgaris x years: Using Aldactone 25mg BID, Winlevi and Tret 0.025%; the latter of which she feels may be too drying for the face. Uses Clinique moisteruizer afterwards.  Endorses acne flares in-between periods on upper lip. 3. Eczema x years, STABLE: Patient endorses she occasionally gets rough spots on her legs and feet for which she treats with Clobetasol ointment PRN.   Skin Cancer Hx: No personal history of skin cancer Family Hx: no family history of skin cancer

## 2024-09-23 NOTE — PHYSICAL EXAM
[FreeTextEntry3] : Full body skin exam was performed. The patient is well-appearing, in no acute distress, alert and oriented x 3. Mood and affect are normal. A complete cutaneous examination of the face, neck, chest, abdomen, back, bilateral arms, bilateral legs, buttocks, digits, nails, eyelids and lips reveals the following significant findings: - Few scattered well demarcated stuck on appearing brown papules on face  - Few fairly uniform and regular brown macules and papules on the trunk and extremities - Several scattered comedones and inflammatory papules on the face - Striae on lower abdomen  - Rough demarcated plaques on dorsal feet b/l  Genital exam declined advised to perform self exams and alert us if any unusual or changing moles Examination of the fingernails and/or toenails was limited as patient with painted fingernails/toenails; patient denies any new lesions on nails at this time; educated to follow up if any new or changing lesions on the nails. Examination of the scalp was limited as patient with hairpiece glued in place. Patient denies any new lesions at this time; educated to follow up if any new or changing lesions.

## 2025-02-03 DIAGNOSIS — D25.9 LEIOMYOMA OF UTERUS, UNSPECIFIED: ICD-10-CM

## 2025-02-04 ENCOUNTER — APPOINTMENT (OUTPATIENT)
Dept: OBGYN | Facility: CLINIC | Age: 40
End: 2025-02-04
Payer: COMMERCIAL

## 2025-02-04 VITALS
DIASTOLIC BLOOD PRESSURE: 85 MMHG | BODY MASS INDEX: 32.95 KG/M2 | WEIGHT: 193 LBS | SYSTOLIC BLOOD PRESSURE: 133 MMHG | HEIGHT: 64 IN

## 2025-02-04 DIAGNOSIS — Z78.9 OTHER SPECIFIED HEALTH STATUS: ICD-10-CM

## 2025-02-04 DIAGNOSIS — Z01.419 ENCOUNTER FOR GYNECOLOGICAL EXAMINATION (GENERAL) (ROUTINE) W/OUT ABNORMAL FINDINGS: ICD-10-CM

## 2025-02-04 DIAGNOSIS — F12.90 CANNABIS USE, UNSPECIFIED, UNCOMPLICATED: ICD-10-CM

## 2025-02-04 DIAGNOSIS — N76.0 ACUTE VAGINITIS: ICD-10-CM

## 2025-02-04 DIAGNOSIS — F12.91 CANNABIS USE, UNSPECIFIED, IN REMISSION: ICD-10-CM

## 2025-02-04 PROCEDURE — 99385 PREV VISIT NEW AGE 18-39: CPT

## 2025-02-04 PROCEDURE — 99459 PELVIC EXAMINATION: CPT

## 2025-02-05 ENCOUNTER — TRANSCRIPTION ENCOUNTER (OUTPATIENT)
Age: 40
End: 2025-02-05

## 2025-02-05 LAB
C TRACH RRNA SPEC QL NAA+PROBE: NOT DETECTED
CANDIDA VAG CYTO: NOT DETECTED
G VAGINALIS+PREV SP MTYP VAG QL MICRO: DETECTED
HPV HIGH+LOW RISK DNA PNL CVX: NOT DETECTED
N GONORRHOEA RRNA SPEC QL NAA+PROBE: NOT DETECTED
SOURCE AMPLIFICATION: NORMAL
T VAGINALIS VAG QL WET PREP: NOT DETECTED

## 2025-02-05 RX ORDER — CLOTRIMAZOLE AND BETAMETHASONE DIPROPIONATE 10; .5 MG/G; MG/G
1-0.05 CREAM TOPICAL
Qty: 1 | Refills: 3 | Status: ACTIVE | COMMUNITY
Start: 2025-02-04 | End: 1900-01-01

## 2025-02-05 RX ORDER — METRONIDAZOLE 500 MG/1
500 TABLET ORAL TWICE DAILY
Qty: 14 | Refills: 0 | Status: ACTIVE | COMMUNITY
Start: 2025-02-05 | End: 1900-01-01

## 2025-02-06 ENCOUNTER — TRANSCRIPTION ENCOUNTER (OUTPATIENT)
Age: 40
End: 2025-02-06

## 2025-02-07 ENCOUNTER — TRANSCRIPTION ENCOUNTER (OUTPATIENT)
Age: 40
End: 2025-02-07

## 2025-02-08 LAB — CYTOLOGY CVX/VAG DOC THIN PREP: NORMAL

## 2025-02-10 ENCOUNTER — TRANSCRIPTION ENCOUNTER (OUTPATIENT)
Age: 40
End: 2025-02-10

## 2025-02-21 ENCOUNTER — APPOINTMENT (OUTPATIENT)
Dept: OBGYN | Facility: CLINIC | Age: 40
End: 2025-02-21
Payer: COMMERCIAL

## 2025-02-21 VITALS
BODY MASS INDEX: 32.27 KG/M2 | SYSTOLIC BLOOD PRESSURE: 117 MMHG | WEIGHT: 189 LBS | HEIGHT: 64 IN | DIASTOLIC BLOOD PRESSURE: 84 MMHG

## 2025-02-21 DIAGNOSIS — N76.0 ACUTE VAGINITIS: ICD-10-CM

## 2025-02-21 PROCEDURE — 99459 PELVIC EXAMINATION: CPT

## 2025-02-21 PROCEDURE — 99213 OFFICE O/P EST LOW 20 MIN: CPT

## 2025-02-21 RX ORDER — FLUCONAZOLE 150 MG/1
150 TABLET ORAL
Qty: 10 | Refills: 0 | Status: ACTIVE | COMMUNITY
Start: 2025-02-21 | End: 1900-01-01

## 2025-02-21 RX ORDER — TERCONAZOLE 4 MG/G
0.4 CREAM VAGINAL
Qty: 1 | Refills: 0 | Status: ACTIVE | COMMUNITY
Start: 2025-02-10 | End: 1900-01-01

## 2025-02-25 ENCOUNTER — TRANSCRIPTION ENCOUNTER (OUTPATIENT)
Age: 40
End: 2025-02-25

## 2025-02-25 LAB
CANDIDA VAG CYTO: DETECTED
G VAGINALIS+PREV SP MTYP VAG QL MICRO: DETECTED
T VAGINALIS VAG QL WET PREP: NOT DETECTED

## 2025-07-23 ENCOUNTER — TRANSCRIPTION ENCOUNTER (OUTPATIENT)
Age: 40
End: 2025-07-23

## 2025-07-24 ENCOUNTER — TRANSCRIPTION ENCOUNTER (OUTPATIENT)
Age: 40
End: 2025-07-24

## 2025-07-28 ENCOUNTER — APPOINTMENT (OUTPATIENT)
Dept: OBGYN | Facility: CLINIC | Age: 40
End: 2025-07-28

## 2025-07-29 ENCOUNTER — APPOINTMENT (OUTPATIENT)
Dept: OBGYN | Facility: CLINIC | Age: 40
End: 2025-07-29
Payer: COMMERCIAL

## 2025-07-29 VITALS
BODY MASS INDEX: 32.61 KG/M2 | HEIGHT: 64 IN | WEIGHT: 191 LBS | DIASTOLIC BLOOD PRESSURE: 80 MMHG | SYSTOLIC BLOOD PRESSURE: 135 MMHG

## 2025-07-29 DIAGNOSIS — N76.0 ACUTE VAGINITIS: ICD-10-CM

## 2025-07-29 PROCEDURE — 99213 OFFICE O/P EST LOW 20 MIN: CPT

## 2025-07-29 PROCEDURE — 99459 PELVIC EXAMINATION: CPT

## 2025-08-01 LAB
CANDIDA VAG CYTO: NOT DETECTED
G VAGINALIS+PREV SP MTYP VAG QL MICRO: NOT DETECTED
T VAGINALIS VAG QL WET PREP: NOT DETECTED

## 2025-08-25 ENCOUNTER — APPOINTMENT (OUTPATIENT)
Dept: OBGYN | Facility: CLINIC | Age: 40
End: 2025-08-25
Payer: COMMERCIAL

## 2025-08-25 VITALS
BODY MASS INDEX: 32.95 KG/M2 | DIASTOLIC BLOOD PRESSURE: 88 MMHG | SYSTOLIC BLOOD PRESSURE: 128 MMHG | WEIGHT: 193 LBS | HEIGHT: 64 IN

## 2025-08-25 DIAGNOSIS — N76.0 ACUTE VAGINITIS: ICD-10-CM

## 2025-08-25 PROCEDURE — 99459 PELVIC EXAMINATION: CPT

## 2025-08-25 PROCEDURE — 99213 OFFICE O/P EST LOW 20 MIN: CPT

## 2025-08-27 RX ORDER — METRONIDAZOLE 7.5 MG/G
0.75 GEL VAGINAL
Qty: 3 | Refills: 0 | Status: ACTIVE | COMMUNITY
Start: 2025-08-27 | End: 1900-01-01

## 2025-08-27 RX ORDER — METRONIDAZOLE 500 MG/1
500 TABLET ORAL TWICE DAILY
Qty: 14 | Refills: 0 | Status: ACTIVE | COMMUNITY
Start: 2025-08-27 | End: 1900-01-01

## 2025-08-29 ENCOUNTER — TRANSCRIPTION ENCOUNTER (OUTPATIENT)
Age: 40
End: 2025-08-29

## 2025-09-09 ENCOUNTER — TRANSCRIPTION ENCOUNTER (OUTPATIENT)
Age: 40
End: 2025-09-09

## 2025-09-10 ENCOUNTER — APPOINTMENT (OUTPATIENT)
Dept: OBGYN | Facility: CLINIC | Age: 40
End: 2025-09-10
Payer: COMMERCIAL

## 2025-09-10 VITALS — DIASTOLIC BLOOD PRESSURE: 88 MMHG | SYSTOLIC BLOOD PRESSURE: 143 MMHG | BODY MASS INDEX: 32.79 KG/M2 | WEIGHT: 191 LBS

## 2025-09-10 PROCEDURE — 99213 OFFICE O/P EST LOW 20 MIN: CPT

## 2025-09-10 PROCEDURE — 99459 PELVIC EXAMINATION: CPT

## 2025-09-15 LAB
C TRACH RRNA SPEC QL NAA+PROBE: NOT DETECTED
CANDIDA VAG CYTO: NOT DETECTED
G VAGINALIS+PREV SP MTYP VAG QL MICRO: NOT DETECTED
N GONORRHOEA RRNA SPEC QL NAA+PROBE: NOT DETECTED
SOURCE AMPLIFICATION: NORMAL
T VAGINALIS VAG QL WET PREP: NOT DETECTED